# Patient Record
Sex: FEMALE | Race: WHITE | NOT HISPANIC OR LATINO | Employment: OTHER | URBAN - METROPOLITAN AREA
[De-identification: names, ages, dates, MRNs, and addresses within clinical notes are randomized per-mention and may not be internally consistent; named-entity substitution may affect disease eponyms.]

---

## 2018-09-04 ENCOUNTER — OFFICE VISIT (OUTPATIENT)
Dept: OBSTETRICS AND GYNECOLOGY | Facility: CLINIC | Age: 63
End: 2018-09-04

## 2018-09-04 VITALS
DIASTOLIC BLOOD PRESSURE: 80 MMHG | HEIGHT: 64 IN | WEIGHT: 177 LBS | SYSTOLIC BLOOD PRESSURE: 136 MMHG | BODY MASS INDEX: 30.22 KG/M2

## 2018-09-04 DIAGNOSIS — Z12.39 ENCOUNTER FOR OTHER SCREENING FOR MALIGNANT NEOPLASM OF BREAST: ICD-10-CM

## 2018-09-04 DIAGNOSIS — R35.0 FREQUENCY OF MICTURITION: ICD-10-CM

## 2018-09-04 DIAGNOSIS — Z85.3 HISTORY OF RIGHT BREAST CANCER: ICD-10-CM

## 2018-09-04 DIAGNOSIS — Z01.419 ENCOUNTER FOR WELL WOMAN EXAM WITH ROUTINE GYNECOLOGICAL EXAM: Primary | ICD-10-CM

## 2018-09-04 PROBLEM — Z90.11 HISTORY OF RIGHT MASTECTOMY: Status: ACTIVE | Noted: 2018-09-04

## 2018-09-04 PROBLEM — E03.9 ACQUIRED HYPOTHYROIDISM: Status: ACTIVE | Noted: 2018-09-04

## 2018-09-04 PROCEDURE — G0101 CA SCREEN;PELVIC/BREAST EXAM: HCPCS | Performed by: OBSTETRICS & GYNECOLOGY

## 2018-09-04 RX ORDER — DIGOXIN 250 MCG
TABLET ORAL
COMMUNITY

## 2018-09-04 RX ORDER — LEVOTHYROXINE SODIUM 0.07 MG/1
TABLET ORAL
COMMUNITY

## 2018-09-04 RX ORDER — NICOTINE POLACRILEX 4 MG/1
GUM, CHEWING ORAL
COMMUNITY

## 2018-09-04 NOTE — PROGRESS NOTES
Subjective   Chief Complaint   Patient presents with   • Annual Exam     RYLEE Beltran is a 63 y.o. year old  menopausal female presenting to be seen for her annual exam.  She's postmenopausal since about .  Last Pap test was .  There has not been vaginal bleeding in the last 12 months.  Hot flashes and night sweats are not a significant problem.    She has 3 problems she will like to discuss.  One is a skin tag that had her panty line and is bothersome.  She reports she has an appointment to see a dermatologist.    2 is a need for a oncologist as a female in [place] got in some legal problems and is no longer practices.  Apparently her  may been bringing some drugs in from [place]?  3 is that she's having urinary frequency.  Discussed coffee intake maybe 1 or 2 cups per day maybe a colon.  No real citrus intake.  She is able to go about an hour to 2 between voiding.  No real nocturia so I doubt there is a bladder infection.  Discussed that we'll give her an interstitial cystitis diet because it could be something healthy such as tomato that's irritating her bladder.  She's had a recent urinalysis that was negative.  Those records were reviewed and will be scanned into her Chart      SEXUAL Hx:  She is sexually active.  Vaginal dryness is not a problem.    HEALTH Hx:  She exercises regularly: not asked.  She wears her seat belt:yes.  She has concerns about domestic violence: no.  She has noticed changes in height: no.              Calcium intake is  adequate    The following portions of the patient's history were reviewed and updated as appropriate:problem list, current medications, allergies, past family history, past medical history, past social history and past surgical history.    Smoking status: Former Smoker                                                              Packs/day: 0.00      Years: 0.00      Smokeless tobacco: Never Used                        Review of Systems  "  Her bladder are normal has history of constipation.  3 vaginal deliveries largest was 9 lbs. 8 oz.      Objective   /80   Ht 161.3 cm (63.5\")   Wt 80.3 kg (177 lb)   BMI 30.86 kg/m²      General:  well developed; well nourished  no acute distress  appears stated age   Skin:  No suspicious lesions seen   Thyroid: not examined   Breasts:  Evidence of bilateral reconstruction with implant on the right.  Nodularity on the left breast tissue in general fibroglandular tissue   Abdomen: soft, non-tender; no masses  no umbilical or inginual hernias are present  no hepato-splenomegaly   Pelvis: Clinical staff was present for exam  External genitalia:  normal appearance of the external genitalia including Bartholin's and Pierce's glands. See below  :  normal appearance of the external genitalia including Bartholin's and Pierce's glands. There is a fairly wide based 1+ centimeter skin tag on the left inner thigh  Vaginal:  atrophic mucosal changes are present; she is able to perform a Kegel contraction upon request;  Cervix:  normal appearance. There is a small 5 mm polyp in the endocervix which is sampled with Pap testing  Uterus:  normal size, shape and consistency. anteverted;  Adnexa:  normal bimanual exam of the adnexa.  Rectal:  digital rectal exam not performed; anus visually normal appearing.        Assessment   1. Normal postmenopausal GYN exam with benign appearing skin tag left inner thigh.  Wide based discussed I could not remove that today would be happy to do that in the future should the dermatology appointment did not work out.    2. Urinary frequency but her bladder is nontender and she had a normal urinalysis recently.  I think this may be dietary more than anything we'll give her interstitial cystitis diet.  Suggest she limited to 1 or maybe 1/2 cups coffee per day.    3. Recent diagnosis of prediabetes   4. Personal history of breast cancer 2004 request oncologist to follow up with.  I have " requested that she may see one of the [facility] doctors in [facility] which is closer to her home.   5. Osteoporosis by history.    6. Constipation      Plan   1. Calcium discussed  1200 mg daily in divided doses ideally in diet  2. Regular weight bearing exercise  3. Breast self awareness, mammograms discussed-I had difficulty with a waiver form in epic today scheduling a unilateral mammogram.  Hopefully hematology oncology physician has better luck.  4. Follow-up Pap co-testing.  If she has another we could stop screening but her last was in 2002.    No orders of the defined types were placed in this encounter.          This note was electronically signed.    Luis Whiting M.D.  September 4, 2018

## 2018-09-05 PROBLEM — R73.03 PRE-DIABETES: Status: ACTIVE | Noted: 2018-09-05

## 2018-09-27 ENCOUNTER — CONSULT (OUTPATIENT)
Dept: ONCOLOGY | Facility: CLINIC | Age: 63
End: 2018-09-27

## 2018-09-27 VITALS
HEART RATE: 74 BPM | BODY MASS INDEX: 31.13 KG/M2 | HEIGHT: 63 IN | WEIGHT: 175.7 LBS | RESPIRATION RATE: 18 BRPM | TEMPERATURE: 98.4 F | DIASTOLIC BLOOD PRESSURE: 81 MMHG | SYSTOLIC BLOOD PRESSURE: 138 MMHG | OXYGEN SATURATION: 99 %

## 2018-09-27 DIAGNOSIS — Z12.11 COLON CANCER SCREENING: ICD-10-CM

## 2018-09-27 DIAGNOSIS — M81.0 POSTMENOPAUSAL OSTEOPOROSIS: ICD-10-CM

## 2018-09-27 DIAGNOSIS — Z85.3 HISTORY OF BREAST CANCER: ICD-10-CM

## 2018-09-27 DIAGNOSIS — M43.16 SPONDYLOLISTHESIS OF LUMBAR REGION: ICD-10-CM

## 2018-09-27 DIAGNOSIS — Z90.11 HISTORY OF RIGHT MASTECTOMY: Primary | ICD-10-CM

## 2018-09-27 LAB
ALBUMIN SERPL-MCNC: 4.4 G/DL (ref 3.4–4.8)
ALBUMIN/GLOB SERPL: 1.5 G/DL (ref 1.5–2.5)
ALP SERPL-CCNC: 73 U/L (ref 35–104)
ALT SERPL W P-5'-P-CCNC: 35 U/L (ref 10–36)
ANION GAP SERPL CALCULATED.3IONS-SCNC: 6.3 MMOL/L (ref 3.6–11.2)
AST SERPL-CCNC: 26 U/L (ref 10–30)
BASOPHILS # BLD AUTO: 0.02 10*3/MM3 (ref 0–0.3)
BASOPHILS NFR BLD AUTO: 0.3 % (ref 0–2)
BILIRUB SERPL-MCNC: 0.4 MG/DL (ref 0.2–1.8)
BUN BLD-MCNC: 16 MG/DL (ref 7–21)
BUN/CREAT SERPL: 16 (ref 7–25)
CALCIUM SPEC-SCNC: 9.3 MG/DL (ref 7.7–10)
CHLORIDE SERPL-SCNC: 106 MMOL/L (ref 99–112)
CO2 SERPL-SCNC: 29.7 MMOL/L (ref 24.3–31.9)
CREAT BLD-MCNC: 1 MG/DL (ref 0.43–1.29)
DEPRECATED RDW RBC AUTO: 44.4 FL (ref 37–54)
EOSINOPHIL # BLD AUTO: 0.29 10*3/MM3 (ref 0–0.7)
EOSINOPHIL NFR BLD AUTO: 4.4 % (ref 0–7)
ERYTHROCYTE [DISTWIDTH] IN BLOOD BY AUTOMATED COUNT: 14 % (ref 11.5–14.5)
GFR SERPL CREATININE-BSD FRML MDRD: 56 ML/MIN/1.73
GLOBULIN UR ELPH-MCNC: 2.9 GM/DL
GLUCOSE BLD-MCNC: 101 MG/DL (ref 70–110)
HCT VFR BLD AUTO: 40.9 % (ref 37–47)
HGB BLD-MCNC: 12.9 G/DL (ref 12–16)
IMM GRANULOCYTES # BLD: 0.01 10*3/MM3 (ref 0–0.03)
IMM GRANULOCYTES NFR BLD: 0.2 % (ref 0–0.5)
LYMPHOCYTES # BLD AUTO: 1.98 10*3/MM3 (ref 1–3)
LYMPHOCYTES NFR BLD AUTO: 29.9 % (ref 16–46)
MCH RBC QN AUTO: 27.8 PG (ref 27–33)
MCHC RBC AUTO-ENTMCNC: 31.5 G/DL (ref 33–37)
MCV RBC AUTO: 88.1 FL (ref 80–94)
MONOCYTES # BLD AUTO: 0.67 10*3/MM3 (ref 0.1–0.9)
MONOCYTES NFR BLD AUTO: 10.1 % (ref 0–12)
NEUTROPHILS # BLD AUTO: 3.66 10*3/MM3 (ref 1.4–6.5)
NEUTROPHILS NFR BLD AUTO: 55.1 % (ref 40–75)
OSMOLALITY SERPL CALC.SUM OF ELEC: 284.4 MOSM/KG (ref 273–305)
PLATELET # BLD AUTO: 231 10*3/MM3 (ref 130–400)
PMV BLD AUTO: 10.8 FL (ref 6–10)
POTASSIUM BLD-SCNC: 4.2 MMOL/L (ref 3.5–5.3)
PROT SERPL-MCNC: 7.3 G/DL (ref 6–8)
RBC # BLD AUTO: 4.64 10*6/MM3 (ref 4.2–5.4)
SODIUM BLD-SCNC: 142 MMOL/L (ref 135–153)
WBC NRBC COR # BLD: 6.63 10*3/MM3 (ref 4.5–12.5)

## 2018-09-27 PROCEDURE — 80053 COMPREHEN METABOLIC PANEL: CPT | Performed by: INTERNAL MEDICINE

## 2018-09-27 PROCEDURE — 85025 COMPLETE CBC W/AUTO DIFF WBC: CPT | Performed by: INTERNAL MEDICINE

## 2018-09-27 PROCEDURE — 99205 OFFICE O/P NEW HI 60 MIN: CPT | Performed by: INTERNAL MEDICINE

## 2018-10-02 DIAGNOSIS — M43.16 SPONDYLOLISTHESIS OF LUMBAR REGION: Primary | ICD-10-CM

## 2018-10-23 ENCOUNTER — HOSPITAL ENCOUNTER (OUTPATIENT)
Dept: MRI IMAGING | Facility: HOSPITAL | Age: 63
Discharge: HOME | End: 2018-10-23
Attending: INTERNAL MEDICINE | Admitting: INTERNAL MEDICINE

## 2018-10-23 DIAGNOSIS — M43.16 SPONDYLOLISTHESIS OF LUMBAR REGION: ICD-10-CM

## 2018-10-23 PROCEDURE — A9577 INJ MULTIHANCE: HCPCS | Performed by: INTERNAL MEDICINE

## 2018-10-23 PROCEDURE — 72158 MRI LUMBAR SPINE W/O & W/DYE: CPT | Performed by: RADIOLOGY

## 2018-10-23 PROCEDURE — 0 GADOBENATE DIMEGLUMINE 529 MG/ML SOLUTION: Performed by: INTERNAL MEDICINE

## 2018-10-23 PROCEDURE — 72158 MRI LUMBAR SPINE W/O & W/DYE: CPT

## 2018-10-23 RX ADMIN — GADOBENATE DIMEGLUMINE 16 ML: 529 INJECTION, SOLUTION INTRAVENOUS at 10:20

## 2018-10-26 ENCOUNTER — APPOINTMENT (OUTPATIENT)
Dept: BONE DENSITY | Facility: HOSPITAL | Age: 63
End: 2018-10-26

## 2018-10-29 ENCOUNTER — HOSPITAL ENCOUNTER (OUTPATIENT)
Dept: BONE DENSITY | Facility: HOSPITAL | Age: 63
Discharge: HOME | End: 2018-10-29
Admitting: INTERNAL MEDICINE

## 2018-10-29 ENCOUNTER — OFFICE VISIT (OUTPATIENT)
Dept: ONCOLOGY | Facility: CLINIC | Age: 63
End: 2018-10-29

## 2018-10-29 VITALS
DIASTOLIC BLOOD PRESSURE: 78 MMHG | HEART RATE: 61 BPM | OXYGEN SATURATION: 98 % | RESPIRATION RATE: 18 BRPM | BODY MASS INDEX: 30.77 KG/M2 | WEIGHT: 173.7 LBS | SYSTOLIC BLOOD PRESSURE: 143 MMHG | TEMPERATURE: 98.3 F

## 2018-10-29 DIAGNOSIS — Z85.3 HISTORY OF BREAST CANCER: Primary | ICD-10-CM

## 2018-10-29 DIAGNOSIS — M43.16 SPONDYLOLISTHESIS OF LUMBAR REGION: ICD-10-CM

## 2018-10-29 DIAGNOSIS — M81.0 POSTMENOPAUSAL OSTEOPOROSIS: ICD-10-CM

## 2018-10-29 PROCEDURE — 77080 DXA BONE DENSITY AXIAL: CPT | Performed by: RADIOLOGY

## 2018-10-29 PROCEDURE — 77080 DXA BONE DENSITY AXIAL: CPT

## 2018-10-29 PROCEDURE — 99214 OFFICE O/P EST MOD 30 MIN: CPT | Performed by: INTERNAL MEDICINE

## 2018-10-29 NOTE — PROGRESS NOTES
DATE OF VISIT:  10/29/2018    DIAGNOSIS:    Stage II Right Breast cancer diagnosed in     CHIEF COMPLAINT:  History of breast cancer    HISTORY OF PRESENT ILLNESS:   Ritu Beltran is a very pleasant 63 y.o. female who is being seen today at the request of No ref. provider found for evaluation and treatment of breast cancer.  She says she noticed a lump in her right breast in .  Initially she thought it was a cystic lesion because she has fibrocystic breast disease, but when it enlarged she saw her PCP and underwent further evaluation.  Ultimately, she was diagnosed with Stage II Breast Cancer acording to records from her previous oncologist.  She was treated with R modified radical mastectomy and reconstruction.  Dr. Mcwilliams delived AC x 4 followed by Taxotere x 4, which she completed in 2004, and she then took Arimidex through 2009.  She has done well since this time without evidence of recurrence.    Ms. Beltran would like to transfer her follow-up care to our office.  She is overall doing well.  She denies fever or chills.  No chest pain or shortness of breath.  No abdominal pain, nausea or vomiting.  She occasionally has constipation.  She denies rectal bleeding.  She complains of pain in her tail bone for the last 3 months after pushing a riding mower and straining her back.    INTERVAL HISTORY:  Ms. Beltran is here today for f/u of breast cancer.  Since her last visit she has been well.  She had mammogram, DEXA scan and MRI of the lower back.  She is curious to hear the outcome of testing.       PAST MEDICAL HISTORY:  Past Medical History:   Diagnosis Date   • Anxiety    • Arthritis    • Breast cancer in female (CMS/[facility])    • Depression    • Disease of thyroid gland    • Osteoporosis    • PVC (premature ventricular contraction)      Risk Factors:  Age of Menarche: 10  Age of Menopause: 49  Prior Breast Disease: + fibrocystic breast disease  Pregnancies:    History of  Breastfeeding: + with 3rd children  Age of 1st pregnancy: 15 yo  Family History of Breast Cancer: none  Family History of Ovarian Cancer: none     PAST SURGICAL HISTORY:  Past Surgical History:   Procedure Laterality Date   • EYE SURGERY     • MASTECTOMY Right 2004   • RECONSTRUCTION BREAST IMMEDIATE / DELAYED W/ TISSUE EXPANDER Right 2004   Reduction mammoplasty of the left breast    FAMILY HISTORY:  History reviewed. No pertinent family history.   No family h/o breast cancer.  No family h/o ovarian cancer.  No family h/o other malignancies.      SOCIAL HISTORY:  Social History     Social History   • Marital status: Single     Spouse name: N/A   • Number of children: N/A   • Years of education: N/A     Occupational History   • Not on file.     Social History Main Topics   • Smoking status: Former Smoker   • Smokeless tobacco: Never Used   • Alcohol use No   • Drug use: No   • Sexual activity: No     Other Topics Concern   • Not on file     Social History Narrative   • No narrative on file   Lives alone.  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  Prior smoker. Smoked for about 20 yrs, quit in 2016.    REVIEW OF SYSTEMS:   A comprehensive 14 point review of systems was performed.  Significant findings as mentioned above.  All other systems reviewed and are negative.      MEDICATIONS:  The current medication list was reviewed in the EMR    Current Outpatient Prescriptions:   •  digoxin (LANOXIN) 250 MCG tablet, digoxin 250 mcg tablet  Take 1 tablet every day by oral route., Disp: , Rfl:   •  levothyroxine (SYNTHROID, LEVOTHROID) 75 MCG tablet, levothyroxine 75 mcg tablet, Disp: , Rfl:   •  Omeprazole 20 MG tablet delayed-release, omeprazole 20 mg capsule,delayed release, Disp: , Rfl:     ALLERGIES:    Allergies   Allergen Reactions   • Sulfa Antibiotics Rash       PHYSICAL EXAM:  Vitals:    10/29/18 0943   BP: 143/78   Pulse: 61   Resp: 18   Temp: 98.3 °F (36.8 °C)   SpO2: 98%   General:  Awake, alert and oriented, in  no distress  HEENT:  Pupils are equal, round and reactive to light and accommodation, Extra-ocular movements full, Oropharyx clear, mucous membranes moist  Neck:  No JVD, thyromegaly or lymphadenopathy  CV:  Regular rate and rhythm, no murmurs, rubs or gallops  Breast:  S/p R mastectomy and reconstruction.  No palpable nodules.  L breast s/p reduction mammoplasty without mass lesions.  + fibrocystic changes.  No axillary adenopathy on either side.  Resp:  Lungs are clear to auscultation bilaterally  Abd:  Soft, non-tender, non-distended, bowel sounds present, no organomegaly or masses  Ext:  No clubbing, cyanosis or edema  Lymph:  No cervical, supraclavicular, axillary, inguinal or femoral adenopathy  Neuro:  MS as above, CN II-XII intact, grossly non-focal exam      PATHOLOGY:  Not available. Records from prior oncologist's notes.       ENDOSCOPY:  Reports c-scopy performed by Dr. Abhi Santo over 10 yrs ago.      IMAGING:  10-02-18:      10-23-18 MRI Lumbar Spine With & Without Contrast :  MRI FINDINGS: The lumbar vertebral bodies are normal in height and show  normal marrow signal. There is a grade 2 spondylolisthesis at L5-S1.  There is marked narrowing of the L5-S1 disc. The other lumbar discs were  fairly well maintained in height, but show loss of water content on the  long TR weighted scans. There is no significant posterior disc bulging.  The spinal canal was maintained in contour. There are no areas of spinal  stenosis. There are no disc herniations. There are bilateral pars  interarticularis defects at L5-S1. On the postcontrasted scans there is  no abnormal enhancement in the vertebral bodies or in the spinal cord or  cauda equina.     IMPRESSION:  Grade 2 spondylolisthesis at L5-S1 due to bilateral pars  interarticularis defects. There are no disc herniations or areas of  spinal stenosis. There is degenerative disc change, most severe at  L5-S1.     10-29-18 DEXA Bone Density Axial      FINDINGS: The  lowest T score was in the left femoral neck at 0.3. This  corresponds with normal.  All of the other bone mineral densities acquired on today's exam on the  range of normal.     IMPRESSION:  Impression: According to the World Health Organization definitions of  osteoporosis based on bone density, this patient's bone mineral density  is compatible with normal and the fracture risk is low.    RECENT LABS:  Lab Results   Component Value Date    WBC 6.63 09/27/2018    HGB 12.9 09/27/2018    HCT 40.9 09/27/2018    MCV 88.1 09/27/2018    RDW 14.0 09/27/2018     09/27/2018    NEUTRORELPCT 55.1 09/27/2018    LYMPHORELPCT 29.9 09/27/2018    MONORELPCT 10.1 09/27/2018    EOSRELPCT 4.4 09/27/2018    BASORELPCT 0.3 09/27/2018    NEUTROABS 3.66 09/27/2018    LYMPHSABS 1.98 09/27/2018     Lab Results   Component Value Date     09/27/2018    K 4.2 09/27/2018    CO2 29.7 09/27/2018     09/27/2018    BUN 16 09/27/2018    CREATININE 1.00 09/27/2018    EGFRIFNONA 56 (L) 09/27/2018    GLUCOSE 101 09/27/2018    CALCIUM 9.3 09/27/2018    ALKPHOS 73 09/27/2018    AST 26 09/27/2018    ALT 35 09/27/2018    BILITOT 0.4 09/27/2018    ALBUMIN 4.40 09/27/2018    PROTEINTOT 7.3 09/27/2018          ASSESSMENT & PLAN:  Ritu Beltran is a very pleasant 63 y.o. female with a history of Stage II Right Breast cancer diagnosed in 2004.    1.  History of breast cancer:  -  Treated with modified radical mastectomy of the R breast with reconstruction and reduction L mammoplasty.  -  Dr. Mcwilliams delivered AC x 4 followed by Taxotere x 4 which she completed in September 2004.  -  She took 5 years of Arimidex which she completed in September 2009.  -  She has not had evidence of recurrence..  - Mammogram was done in [place] and was without cause for concern. She will be due for repeat mammography in October 2019.     2.  Prophylaxis:  -  Reports she is over due for c-scope (says Dr. Santo performed last one over 10 yrs ago.   -  She says  she is on PPI and still having symptoms.  Referred back to Dr. Santo who is planning EGD and C-scope.  -  She had PAP smear 9-04-18 which showed no cause for concern.    - DEXA shows normal bone density. Recommend repeat exam in 2020.   -  Recommended HAV and 2018 influenza vaccine.    3.  Sacral pain with some radicular symptoms:  - Referral placed to Neurosurgery group in [place] and obtained requested MRI L spine which showed spondylolisthesis / Degenerative changes as above.    4.  ACO Quality measures  - Ritu Beltran does not use tobacco products.  She quit smoking in 2016.  - Patient's Body mass index is 30.77 kg/m². BMI is above normal parameters. Recommendations include: referral to primary care.  - Current outpatient and discharge medications have been reconciled for the patient.  Reviewed by: Sima Wilson MD     This note was scribed for Sima Wilson MD by Char Casarez RN.    I, Sima Wilson MD, personally performed the services described in this documentation as scribed by the above named individual in my presence, and it is both accurate and complete.  10/29/2018     I spent 25 minutes with Ritu Beltran today.  More than 50% of the time was spent in counseling / coordination of care for the above problems.      Electronically Signed by: Sima Wilson MD      CC:   aRjesh Vasquez MD

## 2022-05-18 ENCOUNTER — OFFICE VISIT (OUTPATIENT)
Dept: CARDIOLOGY | Facility: CLINIC | Age: 67
End: 2022-05-18

## 2022-05-18 VITALS
HEIGHT: 64 IN | DIASTOLIC BLOOD PRESSURE: 70 MMHG | HEART RATE: 65 BPM | SYSTOLIC BLOOD PRESSURE: 122 MMHG | BODY MASS INDEX: 27.18 KG/M2 | WEIGHT: 159.2 LBS

## 2022-05-18 DIAGNOSIS — E78.00 HYPERCHOLESTEREMIA: ICD-10-CM

## 2022-05-18 DIAGNOSIS — E03.9 HYPOTHYROIDISM, UNSPECIFIED TYPE: ICD-10-CM

## 2022-05-18 DIAGNOSIS — I10 PRIMARY HYPERTENSION: ICD-10-CM

## 2022-05-18 DIAGNOSIS — I47.1 PSVT (PAROXYSMAL SUPRAVENTRICULAR TACHYCARDIA): ICD-10-CM

## 2022-05-18 DIAGNOSIS — I49.3 PVC (PREMATURE VENTRICULAR CONTRACTION): ICD-10-CM

## 2022-05-18 DIAGNOSIS — R00.2 PALPITATIONS: Primary | ICD-10-CM

## 2022-05-18 DIAGNOSIS — E88.81 METABOLIC SYNDROME: ICD-10-CM

## 2022-05-18 DIAGNOSIS — R07.2 PRECORDIAL PAIN: ICD-10-CM

## 2022-05-18 PROBLEM — I47.10 PSVT (PAROXYSMAL SUPRAVENTRICULAR TACHYCARDIA): Status: ACTIVE | Noted: 2022-05-18

## 2022-05-18 PROBLEM — E88.810 METABOLIC SYNDROME: Status: ACTIVE | Noted: 2022-05-18

## 2022-05-18 PROCEDURE — 93000 ELECTROCARDIOGRAM COMPLETE: CPT | Performed by: INTERNAL MEDICINE

## 2022-05-18 PROCEDURE — 99204 OFFICE O/P NEW MOD 45 MIN: CPT | Performed by: INTERNAL MEDICINE

## 2022-05-18 RX ORDER — ROSUVASTATIN CALCIUM 10 MG/1
10 TABLET, COATED ORAL DAILY
COMMUNITY

## 2022-05-18 RX ORDER — ESCITALOPRAM OXALATE 5 MG/1
5 TABLET ORAL DAILY
COMMUNITY

## 2022-05-18 RX ORDER — LISINOPRIL 2.5 MG/1
2.5 TABLET ORAL DAILY
COMMUNITY
End: 2022-06-29 | Stop reason: DRUGHIGH

## 2022-05-18 NOTE — PROGRESS NOTES
Chief Complaint   Patient presents with   • Establish Care     Pt is here to re-establish care.  She previously was a pt in this office in 2009.  Her PCP had been managing her digoxin (Dr Vasquez)- she transferred to [facility] after his passing.    Pt states she does get rare pain into her chest that will go into her neck, she states she thinks it is gas related.  She does admit palpitations that occur multiple times per day.  She denies dizziness or SOB.  Pt does not take a daily ASA.   • Cardiac History     Pt was previously treated in this office in 2009.  She denies any testing since that time.   • Lab Work     Pt had labs with her PCP in April- they are faxing report over.        CARDIAC COMPLAINTS  chest pressure/discomfort and palpitations      Subjective   Ritu Beltran is a 66 y.o. female came in today for her initial cardiac evaluation.  She has history of hypertension, hypercholesterolemia and hypothyroidism.  She has been having episodes of chest pain and shortness of breath associated with some palpitation in the past.  I have seen her many years ago when she was admitted to the hospital with chest pain.  Underwent stress test in the form of dobutamine which showed reverse redistribution.  She then underwent cardiac catheterization found to have a non critical disease involving the diagonal.  She also was noted to have multiple PVC's.  He was started on beta-blockers and apparently developed some reaction to it.  She wore a monitor that showed few runs of SVT for which she was put on Lanoxin after which the palpitation did improve.  I have not seen her since then.  She recently was transferred to service.  She has been complaining of having some occasional chest pain which radiates up into her neck.  She is not sure when this happens.  She also complain of having palpitation which occurs multiple times per day.  She had labs done recently but I do not have the results.  She used to smoke in the past but  quit few years ago.  Her mother  at the age of 78 with stroke.  She had diabetes before.  Her father has heart disease.  Diabetes does run in the family.    Past Surgical History:   Procedure Laterality Date   • CARDIAC CATHETERIZATION  2007    30% D1. EF 65%. Mild MR   • CARDIOVASCULAR STRESS TEST  2007    @ [facility]. Dobutamine- 87% THR. 162/54. EF 58%.Reverse Redistribution at Bromide   • CONVERTED (HISTORICAL) HOLTER  2007    Avg 80. . 4 runs of SVT.   • CYST REMOVAL  2021    back   • ECHO - CONVERTED  2007    @ [facility]. EF 65%. ? Inferoseptal WMA. Mild MR   • EYE SURGERY     • MASTECTOMY Right    • NOSE SURGERY     • RECONSTRUCTION BREAST IMMEDIATE / DELAYED W/ TISSUE EXPANDER Right        Current Outpatient Medications   Medication Sig Dispense Refill   • digoxin (LANOXIN) 250 MCG tablet digoxin 250 mcg tablet   Take 1 tablet every day by oral route.     • Ergocalciferol (VITAMIN D2 PO) Take 50,000 Units by mouth 1 (One) Time Per Week.     • escitalopram (LEXAPRO) 5 MG tablet Take 5 mg by mouth Daily.     • levothyroxine (SYNTHROID, LEVOTHROID) 75 MCG tablet levothyroxine 75 mcg tablet     • lisinopril (PRINIVIL,ZESTRIL) 2.5 MG tablet Take 2.5 mg by mouth Daily.     • Omeprazole 20 MG tablet delayed-release omeprazole 20 mg capsule,delayed release     • rosuvastatin (CRESTOR) 10 MG tablet Take 10 mg by mouth Daily.       No current facility-administered medications for this visit.           ALLERGIES:  Chocolate, Metoprolol, and Sulfa antibiotics    Past Medical History:   Diagnosis Date   • Anxiety    • Anxiety    • Arthritis    • Breast cancer in female (HCC)    • Depression    • Disease of thyroid gland    • Gastroesophageal reflux disease    • Hyperlipidemia    • Hypertension    • Migraines    • Osteoporosis    • PVC (premature ventricular contraction)    • Vitamin D deficiency        Social History     Tobacco Use   Smoking Status Former Smoker   Smokeless  "Tobacco Never Used          Family History   Problem Relation Age of Onset   • Diabetes Mother    • Stroke Mother    • Heart disease Father    • Diabetes Brother    • Diabetes Maternal Grandmother    • Heart disease Maternal Grandfather    • Diabetes Maternal Grandfather    • Stroke Paternal Grandmother    • Inflammatory bowel disease Paternal Grandfather    • No Known Problems Daughter    • No Known Problems Daughter    • No Known Problems Son        Review of Systems   Constitutional: Negative for decreased appetite and malaise/fatigue.   HENT: Negative for congestion and sore throat.    Eyes: Negative for blurred vision.   Cardiovascular: Positive for chest pain and palpitations.   Respiratory: Negative for shortness of breath and snoring.    Endocrine: Negative for cold intolerance and heat intolerance.   Hematologic/Lymphatic: Negative for adenopathy. Does not bruise/bleed easily.   Skin: Negative for itching, nail changes and skin cancer.   Musculoskeletal: Negative for arthritis and myalgias.   Gastrointestinal: Negative for abdominal pain, dysphagia and heartburn.   Genitourinary: Negative for bladder incontinence and frequency.   Neurological: Negative for dizziness, light-headedness, seizures and vertigo.   Psychiatric/Behavioral: Negative for altered mental status.   Allergic/Immunologic: Negative for environmental allergies and hives.       Diabetes- No  Thyroid- abnormal    Objective     /70 (BP Location: Left arm, Patient Position: Sitting)   Pulse 65   Ht 162.6 cm (64\")   Wt 72.2 kg (159 lb 3.2 oz)   BMI 27.33 kg/m²     Vitals and nursing note reviewed.   Constitutional:       Appearance: Healthy appearance. Not in distress.   Eyes:      Conjunctiva/sclera: Conjunctivae normal.      Pupils: Pupils are equal, round, and reactive to light.   HENT:      Head: Normocephalic.   Pulmonary:      Effort: Pulmonary effort is normal.      Breath sounds: Normal breath sounds.   Cardiovascular:      PMI " at left midclavicular line. Normal rate. Regular rhythm.      Murmurs: There is a systolic murmur.   Abdominal:      General: Bowel sounds are normal.      Palpations: Abdomen is soft.   Musculoskeletal: Normal range of motion.      Cervical back: Normal range of motion and neck supple. Skin:     General: Skin is warm and dry.   Neurological:      Mental Status: Alert, oriented to person, place, and time and oriented to person, place and time.           ECG 12 Lead    Date/Time: 5/18/2022 12:53 PM  Performed by: Tejas Jackson MD  Authorized by: Tejas Jackson MD   Comparison: compared with previous ECG from 7/16/2007  Comparison to previous ECG: Q waves in V1 & V2  Rhythm: sinus rhythm  Rate: normal  Q waves: V1 and V2    QRS axis: normal    Clinical impression: abnormal EKG              @ASSESSMENT/PLAN@  BMI is >= 25 and < 30. (Overweight) The following options were offered after discussion: weight loss educational material (shared in after visit summary), exercise counseling/recommendations and nutrition counseling/recommendations     Diagnoses and all orders for this visit:    1. Palpitations (Primary)  -     Stress Test With Myocardial Perfusion One Day; Future  -     Adult Transthoracic Echo Complete W/ Cont if Necessary Per Protocol; Future    2. Primary hypertension    3. Hypercholesteremia    4. Hypothyroidism, unspecified type    5. PVC (premature ventricular contraction)    6. PSVT (paroxysmal supraventricular tachycardia) (HCC)  -     Adult Transthoracic Echo Complete W/ Cont if Necessary Per Protocol; Future    7. Precordial pain  -     Stress Test With Myocardial Perfusion One Day; Future    8. Metabolic syndrome    At baseline her heart rate is stable.  Her blood pressure is normal.  Her EKG shows normal sinus rhythm, Q waves in the anterior leads.  Her clinical examination reveals a BMI of 27.  She does have a soft systolic murmur at the mitral area.    Regarding the palpitation, she had  PVC in the past and then she also had few runs of SVT.  She still has some palpitation but not as frequent.  I scheduled her to undergo a stress test to evaluate her functional status, chronotropic response, blood pressure response and also to rule out stress-induced arrhythmia or ischemia since she does have episodes of chest pain also.  I also advised her to undergo an echocardiogram to evaluate for LVH, wall motion abnormality, valvular structures as well as the PA pressure.    Regarding her hypertension, it seems to be controlled with lisinopril.  If she does have increased chronotropic response, she may need to be considered for a calcium channel blockers in the form of Cardizem    Regarding her hypercholesterolemia, she is on Crestor.  Continue the same.  She needs LFT and lipid panel checked    Regarding her history of hypothyroidism, she is taking thyroid supplements.  She needs a TSH level checked along with magnesium level.    Regarding her history of PVC as well as PSVT, if she continues to have the arrhythmia then consider changing Lanoxin to a calcium channel blocker, since she had some kind of allergy to metoprolol    Regarding the metabolic syndrome, talked to her about diet and weight reduction.  I gave her papers on Mediterranean diet    Regarding advanced directive, I talked to her about living will and power of  and gave her booklets regarding that.    Based on the results, further recommendations will be made.               Electronically signed by Tejas Jackson MD May 18, 2022 12:52 EDT

## 2022-06-23 ENCOUNTER — HOSPITAL ENCOUNTER (OUTPATIENT)
Dept: CARDIOLOGY | Facility: HOSPITAL | Age: 67
Discharge: HOME | End: 2022-06-23

## 2022-06-23 DIAGNOSIS — R00.2 PALPITATIONS: ICD-10-CM

## 2022-06-23 DIAGNOSIS — R07.2 PRECORDIAL PAIN: ICD-10-CM

## 2022-06-23 DIAGNOSIS — I47.1 PSVT (PAROXYSMAL SUPRAVENTRICULAR TACHYCARDIA): ICD-10-CM

## 2022-06-23 LAB
AORTIC DIMENSIONLESS INDEX: 0.8 (DI)
BH CV ECHO MEAS - ACS: 1.71 CM
BH CV ECHO MEAS - AO MAX PG: 4.8 MMHG
BH CV ECHO MEAS - AO MEAN PG: 2.9 MMHG
BH CV ECHO MEAS - AO ROOT DIAM: 2.6 CM
BH CV ECHO MEAS - AO V2 MAX: 109.9 CM/SEC
BH CV ECHO MEAS - AO V2 VTI: 28.1 CM
BH CV ECHO MEAS - AVA(I,D): 2.8 CM2
BH CV ECHO MEAS - EDV(CUBED): 34.6 ML
BH CV ECHO MEAS - EDV(MOD-SP4): 75.7 ML
BH CV ECHO MEAS - EF(MOD-SP4): 55.5 %
BH CV ECHO MEAS - EF_3D-VOL: 65 %
BH CV ECHO MEAS - ESV(CUBED): 7.2 ML
BH CV ECHO MEAS - ESV(MOD-SP4): 33.7 ML
BH CV ECHO MEAS - FS: 40.8 %
BH CV ECHO MEAS - IVS/LVPW: 1.35 CM
BH CV ECHO MEAS - IVSD: 1.43 CM
BH CV ECHO MEAS - LA A2CS (ATRIAL LENGTH): 5.3 CM
BH CV ECHO MEAS - LA A4C LENGTH: 4.7 CM
BH CV ECHO MEAS - LA DIMENSION: 3.3 CM
BH CV ECHO MEAS - LAT PEAK E' VEL: 11.4 CM/SEC
BH CV ECHO MEAS - LV DIASTOLIC VOL/BSA (35-75): 42.7 CM2
BH CV ECHO MEAS - LV MASS(C)D: 129.9 GRAMS
BH CV ECHO MEAS - LV MAX PG: 3.4 MMHG
BH CV ECHO MEAS - LV MEAN PG: 1.65 MMHG
BH CV ECHO MEAS - LV SYSTOLIC VOL/BSA (12-30): 19 CM2
BH CV ECHO MEAS - LV V1 MAX: 91.7 CM/SEC
BH CV ECHO MEAS - LV V1 VTI: 22.7 CM
BH CV ECHO MEAS - LVIDD: 3.3 CM
BH CV ECHO MEAS - LVIDS: 1.93 CM
BH CV ECHO MEAS - LVOT AREA: 3.5 CM2
BH CV ECHO MEAS - LVOT DIAM: 2.12 CM
BH CV ECHO MEAS - LVPWD: 1.06 CM
BH CV ECHO MEAS - MED PEAK E' VEL: 6.4 CM/SEC
BH CV ECHO MEAS - MR MAX PG: 112.9 MMHG
BH CV ECHO MEAS - MR MAX VEL: 531.3 CM/SEC
BH CV ECHO MEAS - MV A MAX VEL: 83.1 CM/SEC
BH CV ECHO MEAS - MV DEC SLOPE: 291.6 CM/SEC2
BH CV ECHO MEAS - MV E MAX VEL: 79.7 CM/SEC
BH CV ECHO MEAS - MV E/A: 0.96
BH CV ECHO MEAS - MV MAX PG: 4.3 MMHG
BH CV ECHO MEAS - MV MEAN PG: 1.7 MMHG
BH CV ECHO MEAS - MV P1/2T: 94.1 MSEC
BH CV ECHO MEAS - MV V2 VTI: 35.4 CM
BH CV ECHO MEAS - MVA(P1/2T): 2.34 CM2
BH CV ECHO MEAS - MVA(VTI): 2.26 CM2
BH CV ECHO MEAS - PA V2 MAX: 97.6 CM/SEC
BH CV ECHO MEAS - RAP SYSTOLE: 10 MMHG
BH CV ECHO MEAS - RV MAX PG: 1.43 MMHG
BH CV ECHO MEAS - RV V1 MAX: 59.8 CM/SEC
BH CV ECHO MEAS - RV V1 VTI: 13 CM
BH CV ECHO MEAS - RVDD: 3 CM
BH CV ECHO MEAS - RVSP: 35.5 MMHG
BH CV ECHO MEAS - SI(MOD-SP4): 23.7 ML/M2
BH CV ECHO MEAS - SV(LVOT): 79.9 ML
BH CV ECHO MEAS - SV(MOD-SP4): 42 ML
BH CV ECHO MEAS - TR MAX PG: 25.5 MMHG
BH CV ECHO MEAS - TR MAX VEL: 252.3 CM/SEC
BH CV ECHO MEASUREMENTS AVERAGE E/E' RATIO: 8.96
BH CV REST NUCLEAR ISOTOPE DOSE: 10 MCI
BH CV STRESS NUCLEAR ISOTOPE DOSE: 30 MCI
BH CV STRESS RECOVERY BP: NORMAL MMHG
BH CV STRESS RECOVERY HR: 65 BPM
LEFT ATRIUM VOLUME INDEX: 20.6 ML/M2
LEFT ATRIUM VOLUME: 36.4 ML
LV EF NUC BP: 58 %
MAXIMAL PREDICTED HEART RATE: 152 BPM
MAXIMAL PREDICTED HEART RATE: 152 BPM
PERCENT MAX PREDICTED HR: 88.16 %
STRESS BASELINE BP: NORMAL MMHG
STRESS BASELINE HR: 52 BPM
STRESS PERCENT HR: 104 %
STRESS POST ESTIMATED WORKLOAD: 7 METS
STRESS POST EXERCISE DUR MIN: 6 MIN
STRESS POST PEAK BP: NORMAL MMHG
STRESS POST PEAK HR: 134 BPM
STRESS TARGET HR: 129 BPM
STRESS TARGET HR: 129 BPM

## 2022-06-23 PROCEDURE — 78452 HT MUSCLE IMAGE SPECT MULT: CPT

## 2022-06-23 PROCEDURE — 78452 HT MUSCLE IMAGE SPECT MULT: CPT | Performed by: INTERNAL MEDICINE

## 2022-06-23 PROCEDURE — 0 TECHNETIUM SESTAMIBI: Performed by: INTERNAL MEDICINE

## 2022-06-23 PROCEDURE — A9500 TC99M SESTAMIBI: HCPCS | Performed by: INTERNAL MEDICINE

## 2022-06-23 PROCEDURE — 93306 TTE W/DOPPLER COMPLETE: CPT | Performed by: INTERNAL MEDICINE

## 2022-06-23 PROCEDURE — 93017 CV STRESS TEST TRACING ONLY: CPT

## 2022-06-23 PROCEDURE — 93306 TTE W/DOPPLER COMPLETE: CPT

## 2022-06-23 PROCEDURE — 93018 CV STRESS TEST I&R ONLY: CPT | Performed by: INTERNAL MEDICINE

## 2022-06-23 RX ADMIN — TECHNETIUM TC 99M SESTAMIBI 1 DOSE: 1 INJECTION INTRAVENOUS at 08:42

## 2022-06-23 RX ADMIN — TECHNETIUM TC 99M SESTAMIBI 1 DOSE: 1 INJECTION INTRAVENOUS at 10:10

## 2022-06-29 ENCOUNTER — TELEPHONE (OUTPATIENT)
Dept: CARDIOLOGY | Facility: CLINIC | Age: 67
End: 2022-06-29

## 2022-06-29 RX ORDER — LISINOPRIL 5 MG/1
5 TABLET ORAL DAILY
Qty: 30 TABLET | Refills: 11 | Status: SHIPPED | OUTPATIENT
Start: 2022-06-29 | End: 2022-12-08 | Stop reason: SDUPTHER

## 2022-06-29 NOTE — TELEPHONE ENCOUNTER
----- Message from Tejas Jackson MD sent at 6/23/2022  4:48 PM EDT -----  Normal.  Increase Lisinopril

## 2022-06-29 NOTE — TELEPHONE ENCOUNTER
Patient aware of stress test results and recommendations to increase lisinopril to 5 mg daily.  [facility]

## 2022-12-08 ENCOUNTER — OFFICE VISIT (OUTPATIENT)
Dept: CARDIOLOGY | Facility: CLINIC | Age: 67
End: 2022-12-08

## 2022-12-08 VITALS
WEIGHT: 165.6 LBS | SYSTOLIC BLOOD PRESSURE: 152 MMHG | DIASTOLIC BLOOD PRESSURE: 88 MMHG | HEART RATE: 68 BPM | HEIGHT: 64 IN | BODY MASS INDEX: 28.27 KG/M2

## 2022-12-08 DIAGNOSIS — R00.2 PALPITATIONS: ICD-10-CM

## 2022-12-08 DIAGNOSIS — I47.1 PSVT (PAROXYSMAL SUPRAVENTRICULAR TACHYCARDIA): ICD-10-CM

## 2022-12-08 DIAGNOSIS — I49.3 PVC (PREMATURE VENTRICULAR CONTRACTION): ICD-10-CM

## 2022-12-08 DIAGNOSIS — E66.3 OVERWEIGHT: ICD-10-CM

## 2022-12-08 DIAGNOSIS — I10 PRIMARY HYPERTENSION: Primary | ICD-10-CM

## 2022-12-08 DIAGNOSIS — E78.00 HYPERCHOLESTEREMIA: ICD-10-CM

## 2022-12-08 PROCEDURE — 99214 OFFICE O/P EST MOD 30 MIN: CPT | Performed by: NURSE PRACTITIONER

## 2022-12-08 RX ORDER — LISINOPRIL 5 MG/1
5 TABLET ORAL DAILY
Qty: 90 TABLET | Refills: 2 | Status: SHIPPED | OUTPATIENT
Start: 2022-12-08

## 2022-12-08 RX ORDER — DILTIAZEM HYDROCHLORIDE 120 MG/1
120 CAPSULE, COATED, EXTENDED RELEASE ORAL DAILY
Qty: 30 CAPSULE | Refills: 0 | Status: SHIPPED | OUTPATIENT
Start: 2022-12-08 | End: 2023-01-03

## 2022-12-08 RX ORDER — MELOXICAM 7.5 MG/1
7.5 TABLET ORAL DAILY
COMMUNITY

## 2022-12-08 NOTE — PROGRESS NOTES
Chief Complaint   Patient presents with   • Follow-up     Pt is here for cardiac follow up.  Pt admits palpitations daily.  She denies CP, dizziness or SOB.  Pt does not take a daily ASA.   • Med Refill     Pt request 30 day refills to be sent to [facility].  Medications were verified by medication bottles.     • Lab Work     Pt states their last labs were about 6 months ago with her PCP.         Cardiac Complaints  palpitations      Subjective   Ritu Beltran is a 67 y.o. female with HTN, hyperlipidemia, palpitations, and hypothyroidism. Shew was referred in May 2022 for CP, SOA, and palpitations. She had been evaluated in the hospital many years prior and underwent stress test in the form of dobutamine which showed reverse redistribution. She then underwent cardiac catheterization found to have a non critical disease involving the diagonal.  She also was noted to have multiple PVC's.  He was started on beta-blockers and apparently developed some reaction to it.  She wore a monitor that showed few runs of SVT for which she was put on Lanoxin after which the palpitation did improve. She was urged to undergo cardiac workup at consult with stress and echo. Stress showed HTN response, EF 58%, and no obvious ischemia noted, lisinopril dosing increased. Mild pulm HTN noted on echo with mild to mod MR.      She comes today for follow up and continues to have palpitations that she describes daily, has noticed for years. No CP, dizziness, SOA, or syncope noted. Labs followed by PCP, checked about 6 months prior. Refills requested.           Cardiac History  Past Surgical History:   Procedure Laterality Date   • CARDIAC CATHETERIZATION  02/21/2007    30% D1. EF 65%. Mild MR   • CARDIOVASCULAR STRESS TEST  02/20/2007    @ [facility]. Dobutamine- 87% THR. 162/54. EF 58%.Reverse Redistribution at [place]   • CARDIOVASCULAR STRESS TEST  06/23/2022    6 Min.7.0 METS. 88% THR. BP- 221/82. EF 58%. Breast Attenuation   •  CONVERTED (HISTORICAL) HOLTER  07/05/2007    Avg 80. . 4 runs of SVT.   • CYST REMOVAL  08/2021    back   • ECHO - CONVERTED  02/20/2007    @ [facility]. EF 65%. ? Inferoseptal WMA. Mild MR   • ECHO - CONVERTED  06/23/2022    EF 60%. Mild-Mod MR. RVSP- 36 mmHg   • EYE SURGERY     • MASTECTOMY Right 2004   • NOSE SURGERY  1968   • RECONSTRUCTION BREAST IMMEDIATE / DELAYED W/ TISSUE EXPANDER Right 2004       Current Outpatient Medications   Medication Sig Dispense Refill   • digoxin (LANOXIN) 250 MCG tablet digoxin 250 mcg tablet   Take 1 tablet every day by oral route.     • Ergocalciferol (VITAMIN D2 PO) Take 50,000 Units by mouth 1 (One) Time Per Week.     • escitalopram (LEXAPRO) 5 MG tablet Take 5 mg by mouth Daily.     • levothyroxine (SYNTHROID, LEVOTHROID) 75 MCG tablet levothyroxine 75 mcg tablet     • lisinopril (PRINIVIL,ZESTRIL) 5 MG tablet Take 1 tablet by mouth Daily. 90 tablet 2   • meloxicam (MOBIC) 7.5 MG tablet Take 7.5 mg by mouth Daily.     • Omeprazole 20 MG tablet delayed-release omeprazole 20 mg capsule,delayed release     • rosuvastatin (CRESTOR) 10 MG tablet Take 10 mg by mouth Daily.     • dilTIAZem CD (Cardizem CD) 120 MG 24 hr capsule Take 1 capsule by mouth Daily. 30 capsule 0     No current facility-administered medications for this visit.       Chocolate, Metoprolol, and Sulfa antibiotics    Past Medical History:   Diagnosis Date   • Anxiety    • Anxiety    • Arthritis    • Breast cancer in female (HCC) 2004   • Depression    • Disease of thyroid gland    • Gastroesophageal reflux disease    • Hyperlipidemia    • Hypertension    • Migraines    • Osteoporosis    • PVC (premature ventricular contraction)    • Vitamin D deficiency        Social History     Socioeconomic History   • Marital status: Single   Tobacco Use   • Smoking status: Former   • Smokeless tobacco: Never   Vaping Use   • Vaping Use: Never used   Substance and Sexual Activity   • Alcohol use: No   • Drug use: No  "      Family History   Problem Relation Age of Onset   • Diabetes Mother    • Stroke Mother    • Heart disease Father    • Diabetes Brother    • Diabetes Maternal Grandmother    • Heart disease Maternal Grandfather    • Diabetes Maternal Grandfather    • Stroke Paternal Grandmother    • Inflammatory bowel disease Paternal Grandfather    • No Known Problems Daughter    • No Known Problems Daughter    • No Known Problems Son        Review of Systems   Constitutional: Negative for malaise/fatigue and night sweats.   Cardiovascular: Positive for palpitations. Negative for chest pain, claudication, dyspnea on exertion, irregular heartbeat, leg swelling, orthopnea and syncope.   Respiratory: Negative for cough, shortness of breath and wheezing.    Musculoskeletal: Positive for stiffness. Negative for back pain and joint pain.   Gastrointestinal: Negative for anorexia, nausea and vomiting.   Genitourinary: Negative for dysuria, hematuria, hesitancy and nocturia.   Neurological: Negative for dizziness, headaches and light-headedness.   Psychiatric/Behavioral: Negative for depression and memory loss. The patient is not nervous/anxious.            Objective     /88 (BP Location: Left arm, Patient Position: Sitting)   Pulse 68   Ht 162.6 cm (64\")   Wt 75.1 kg (165 lb 9.6 oz)   BMI 28.43 kg/m²     Constitutional:       Appearance: Not in distress.   Eyes:      Pupils: Pupils are equal, round, and reactive to light.   HENT:      Nose: Nose normal.   Pulmonary:      Effort: Pulmonary effort is normal.      Breath sounds: Normal breath sounds.   Cardiovascular:      PMI at left midclavicular line. Normal rate. Regular rhythm.      Murmurs: There is a systolic murmur.   Abdominal:      Palpations: Abdomen is soft.   Musculoskeletal: Normal range of motion.      Cervical back: Normal range of motion and neck supple. Skin:     General: Skin is warm and dry.   Neurological:      Mental Status: Alert and oriented to person, " place and time.         Procedures         Diagnoses and all orders for this visit:    1. Primary hypertension (Primary)    2. PVC (premature ventricular contraction)    3. PSVT (paroxysmal supraventricular tachycardia) (HCC)    4. Palpitations    5. Hypercholesteremia    6. Overweight    Other orders  -     dilTIAZem CD (Cardizem CD) 120 MG 24 hr capsule; Take 1 capsule by mouth Daily.  Dispense: 30 capsule; Refill: 0  -     lisinopril (PRINIVIL,ZESTRIL) 5 MG tablet; Take 1 tablet by mouth Daily.  Dispense: 90 tablet; Refill: 2             Palpitations: Noted today, reported as daily. She was advised to limit her caffeine intake. We will continue on digoxin therapy but add cardizem therapy at 120mg XL daily. If HR should drop too low with addition, we will urge to lower digoxin to 125mcg daily. She will assess symptoms.    HTN: BP still not at goal. Will not increase lisinopril but urge addition of CCB with concomitant palpitations. Limited sodium urged.     Hyperlipidemia: On crestor therapy. Tolerates well. FLP with your office, can we have for review? Limited carb diet urged.     Cardiac status: Stable today. Most recent workup from 2022 negative for ischemia, HTN BP response, and normal LV function.      Hypothyroidism: On synthroid therapy, tolerates well. TSH followed by your office. Palpitations noted, could be hypothyroidism related.     BMI noted at 28.43, good cardiac diet urged with walking as tolerated advised.     Refills per request.    6 month follow up advised or sooner if needed.         Problems Addressed this Visit        Cardiac and Vasculature    PSVT (paroxysmal supraventricular tachycardia) (HCC)    Relevant Medications    dilTIAZem CD (Cardizem CD) 120 MG 24 hr capsule    PVC (premature ventricular contraction)    Relevant Medications    dilTIAZem CD (Cardizem CD) 120 MG 24 hr capsule    Hypercholesteremia    Primary hypertension - Primary    Relevant Medications    dilTIAZem CD (Cardizem  CD) 120 MG 24 hr capsule    lisinopril (PRINIVIL,ZESTRIL) 5 MG tablet    Palpitations   Other Visit Diagnoses     Overweight          Diagnoses       Codes Comments    Primary hypertension    -  Primary ICD-10-CM: I10  ICD-9-CM: 401.9     PVC (premature ventricular contraction)     ICD-10-CM: I49.3  ICD-9-CM: 427.69     PSVT (paroxysmal supraventricular tachycardia) (HCC)     ICD-10-CM: I47.1  ICD-9-CM: 427.0     Palpitations     ICD-10-CM: R00.2  ICD-9-CM: 785.1     Hypercholesteremia     ICD-10-CM: E78.00  ICD-9-CM: 272.0     Overweight     ICD-10-CM: E66.3  ICD-9-CM: 278.02                        Electronically signed by Colette Garibay, DENISE December 8, 2022 09:46 EST

## 2023-01-03 RX ORDER — DILTIAZEM HYDROCHLORIDE 120 MG/1
120 CAPSULE, COATED, EXTENDED RELEASE ORAL DAILY
Qty: 30 CAPSULE | Refills: 3 | Status: SHIPPED | OUTPATIENT
Start: 2023-01-03

## 2023-06-14 ENCOUNTER — OFFICE VISIT (OUTPATIENT)
Dept: CARDIOLOGY | Facility: CLINIC | Age: 68
End: 2023-06-14
Payer: MEDICARE

## 2023-06-14 VITALS
WEIGHT: 166.4 LBS | DIASTOLIC BLOOD PRESSURE: 84 MMHG | HEIGHT: 64 IN | BODY MASS INDEX: 28.41 KG/M2 | SYSTOLIC BLOOD PRESSURE: 144 MMHG | HEART RATE: 58 BPM

## 2023-06-14 DIAGNOSIS — E66.3 OVERWEIGHT: ICD-10-CM

## 2023-06-14 DIAGNOSIS — E78.00 HYPERCHOLESTEREMIA: ICD-10-CM

## 2023-06-14 DIAGNOSIS — I10 PRIMARY HYPERTENSION: ICD-10-CM

## 2023-06-14 DIAGNOSIS — R00.2 PALPITATIONS: ICD-10-CM

## 2023-06-14 DIAGNOSIS — I49.3 PVC (PREMATURE VENTRICULAR CONTRACTION): ICD-10-CM

## 2023-06-14 DIAGNOSIS — I47.1 PSVT (PAROXYSMAL SUPRAVENTRICULAR TACHYCARDIA): Primary | ICD-10-CM

## 2023-06-14 RX ORDER — DILTIAZEM HYDROCHLORIDE 120 MG/1
120 CAPSULE, COATED, EXTENDED RELEASE ORAL DAILY
Qty: 90 CAPSULE | Refills: 3 | Status: SHIPPED | OUTPATIENT
Start: 2023-06-14

## 2023-06-14 RX ORDER — LISINOPRIL 10 MG/1
10 TABLET ORAL DAILY
Qty: 90 TABLET | Refills: 3 | Status: SHIPPED | OUTPATIENT
Start: 2023-06-14

## 2023-06-14 NOTE — PROGRESS NOTES
Chief Complaint   Patient presents with    Follow-up     Pt is here for cardiac follow up. Pt states she does have palpitations, but relates it to caffeine.   Pt denies CP, SOB, or dizziness. Pt does not take a daily ASA.      Med Refill     Pt request 30 day refills to be sent to the [facility].  Medications were verified by the pt.      Lab Work     Pt states their last labs were in the Spring with her PCP.         Cardiac Complaints  palpitations      Subjective   Ritu Beltran is a 67 y.o. female with HTN, hyperlipidemia, palpitations, and hypothyroidism. Shew was referred in May 2022 for CP, SOA, and palpitations. She had been evaluated in the hospital many years prior and underwent stress test in the form of dobutamine which showed reverse redistribution. She then underwent cardiac catheterization found to have a non critical disease involving the diagonal.  She also was noted to have multiple PVC's.  He was started on beta-blockers and apparently developed some reaction to it.  She wore a monitor that showed few runs of SVT for which she was put on Lanoxin after which the palpitation did improve. She was urged to undergo cardiac workup at consult with stress and echo. Stress showed HTN response, EF 58%, and no obvious ischemia noted, lisinopril dosing increased. Mild pulm HTN noted on echo with mild to mod MR. Last visit, medication changes made for better BP control.    She comes today for follow up and continues to have palpitations that she describes daily, has noticed for years, usually with caffeine. No CP, dizziness, SOA, or syncope noted. Labs followed by PCP, checked about 6 months prior. Refills requested.          Cardiac History  Past Surgical History:   Procedure Laterality Date    CARDIAC CATHETERIZATION  02/21/2007    30% D1. EF 65%. Mild MR    CARDIOVASCULAR STRESS TEST  02/20/2007    @ [facility]. Dobutamine- 87% THR. 162/54. EF 58%.Reverse Redistribution at [place]    CARDIOVASCULAR STRESS  TEST  06/23/2022    6 Min.7.0 METS. 88% THR. BP- 221/82. EF 58%. Breast Attenuation    CONVERTED (HISTORICAL) HOLTER  07/05/2007    Avg 80. . 4 runs of SVT.    CYST REMOVAL  08/2021    back    ECHO - CONVERTED  02/20/2007    @ [facility]. EF 65%. ? Inferoseptal WMA. Mild MR    ECHO - CONVERTED  06/23/2022    EF 60%. Mild-Mod MR. RVSP- 36 mmHg    EYE SURGERY      MASTECTOMY Right 2004    NOSE SURGERY  1968    RECONSTRUCTION BREAST IMMEDIATE / DELAYED W/ TISSUE EXPANDER Right 2004       Current Outpatient Medications   Medication Sig Dispense Refill    digoxin (LANOXIN) 250 MCG tablet digoxin 250 mcg tablet   Take 1 tablet every day by oral route.      dilTIAZem CD (CARDIZEM CD) 120 MG 24 hr capsule Take 1 capsule by mouth Daily. 90 capsule 3    Ergocalciferol (VITAMIN D2 PO) Take 50,000 Units by mouth 1 (One) Time Per Week.      escitalopram (LEXAPRO) 5 MG tablet Take 1 tablet by mouth Daily.      LATANOPROST OP Latanoprost      levothyroxine (SYNTHROID, LEVOTHROID) 75 MCG tablet levothyroxine 75 mcg tablet      meloxicam (MOBIC) 7.5 MG tablet Take 1 tablet by mouth Daily.      Omeprazole 20 MG tablet delayed-release omeprazole 20 mg capsule,delayed release      rosuvastatin (CRESTOR) 10 MG tablet Take 1 tablet by mouth Daily.      lisinopril (PRINIVIL,ZESTRIL) 10 MG tablet Take 1 tablet by mouth Daily. 90 tablet 3     No current facility-administered medications for this visit.       Chocolate, Metoprolol, and Sulfa antibiotics    Past Medical History:   Diagnosis Date    Anxiety     Anxiety     Arthritis     Breast cancer in female 2004    Depression     Disease of thyroid gland     Gastroesophageal reflux disease     Glaucoma     Hyperlipidemia     Hypertension     Migraines     Osteoporosis     PVC (premature ventricular contraction)     Vitamin D deficiency        Social History     Socioeconomic History    Marital status: Single   Tobacco Use    Smoking status: Former    Smokeless tobacco: Never   Vaping Use     "Vaping Use: Never used   Substance and Sexual Activity    Alcohol use: No    Drug use: No       Family History   Problem Relation Age of Onset    Diabetes Mother     Stroke Mother     Heart disease Father     Diabetes Brother     Diabetes Maternal Grandmother     Heart disease Maternal Grandfather     Diabetes Maternal Grandfather     Stroke Paternal Grandmother     Inflammatory bowel disease Paternal Grandfather     No Known Problems Daughter     No Known Problems Daughter     No Known Problems Son        Review of Systems   Constitutional: Negative for malaise/fatigue and night sweats.   Cardiovascular:  Positive for palpitations. Negative for chest pain, claudication, dyspnea on exertion, irregular heartbeat, leg swelling, near-syncope, orthopnea and syncope.   Respiratory:  Negative for cough, shortness of breath and wheezing.    Musculoskeletal:  Positive for stiffness. Negative for back pain and joint pain.   Gastrointestinal:  Negative for anorexia, heartburn, nausea and vomiting.   Genitourinary:  Negative for dysuria, hematuria, hesitancy and nocturia.   Neurological:  Negative for dizziness, headaches and light-headedness.   Psychiatric/Behavioral:  Negative for depression and memory loss. The patient is not nervous/anxious.          Objective     /84 (BP Location: Left arm, Patient Position: Sitting)   Pulse 58   Ht 162.6 cm (64\")   Wt 75.5 kg (166 lb 6.4 oz)   BMI 28.56 kg/m²     Constitutional:       Appearance: Not in distress.   Eyes:      Pupils: Pupils are equal, round, and reactive to light.   HENT:      Nose: Nose normal.   Pulmonary:      Effort: Pulmonary effort is normal.      Breath sounds: Normal breath sounds.   Cardiovascular:      PMI at left midclavicular line. Normal rate. Regular rhythm.      Murmurs: There is a systolic murmur.   Abdominal:      Palpations: Abdomen is soft.   Musculoskeletal: Normal range of motion.      Cervical back: Normal range of motion and neck supple. " Skin:     General: Skin is warm and dry.   Neurological:      Mental Status: Alert.       Procedures         Diagnoses and all orders for this visit:    1. PSVT (paroxysmal supraventricular tachycardia) (Primary)    2. Primary hypertension    3. PVC (premature ventricular contraction)    4. Palpitations    5. Hypercholesteremia    6. Overweight    Other orders  -     lisinopril (PRINIVIL,ZESTRIL) 10 MG tablet; Take 1 tablet by mouth Daily.  Dispense: 90 tablet; Refill: 3  -     dilTIAZem CD (CARDIZEM CD) 120 MG 24 hr capsule; Take 1 capsule by mouth Daily.  Dispense: 90 capsule; Refill: 3             Palpitations: Noted today, reported as daily. She was advised to limit her caffeine intake. We will continue on digoxin therapy as well as cardizem.     HTN: BP still not at goal. Will not increase lisinopril to 10mg daily and continue cardizem at same. Limited sodium urged.      Hyperlipidemia: On crestor therapy. Tolerates well. FLP with your office, can we have for review? Limited carb diet urged.      Cardiac status: Stable today. Most recent workup from 2022 negative for ischemia, HTN BP response, and normal LV function.       Hypothyroidism: On synthroid therapy, tolerates well. TSH followed by your office. Palpitations noted, no worse than prior.     BMI noted at 28.56, good cardiac diet urged with walking as tolerated advised.      Refills per request.     6 month follow up advised or sooner if needed.         Problems Addressed this Visit          Cardiac and Vasculature    PSVT (paroxysmal supraventricular tachycardia) - Primary    Relevant Medications    dilTIAZem CD (CARDIZEM CD) 120 MG 24 hr capsule    PVC (premature ventricular contraction)    Relevant Medications    dilTIAZem CD (CARDIZEM CD) 120 MG 24 hr capsule    Hypercholesteremia    Primary hypertension    Relevant Medications    lisinopril (PRINIVIL,ZESTRIL) 10 MG tablet    dilTIAZem CD (CARDIZEM CD) 120 MG 24 hr capsule    Palpitations     Other  Visit Diagnoses       Overweight              Diagnoses         Codes Comments    PSVT (paroxysmal supraventricular tachycardia)    -  Primary ICD-10-CM: I47.1  ICD-9-CM: 427.0     Primary hypertension     ICD-10-CM: I10  ICD-9-CM: 401.9     PVC (premature ventricular contraction)     ICD-10-CM: I49.3  ICD-9-CM: 427.69     Palpitations     ICD-10-CM: R00.2  ICD-9-CM: 785.1     Hypercholesteremia     ICD-10-CM: E78.00  ICD-9-CM: 272.0     Overweight     ICD-10-CM: E66.3  ICD-9-CM: 278.02                       Electronically signed by Colette Garibay, APRN June 14, 2023 11:28 EDT

## 2024-01-03 ENCOUNTER — OFFICE VISIT (OUTPATIENT)
Dept: CARDIOLOGY | Facility: CLINIC | Age: 69
End: 2024-01-03
Payer: MEDICARE

## 2024-01-03 VITALS
HEIGHT: 64 IN | DIASTOLIC BLOOD PRESSURE: 90 MMHG | HEART RATE: 60 BPM | BODY MASS INDEX: 28.1 KG/M2 | WEIGHT: 164.6 LBS | SYSTOLIC BLOOD PRESSURE: 144 MMHG

## 2024-01-03 DIAGNOSIS — R00.2 PALPITATIONS: ICD-10-CM

## 2024-01-03 DIAGNOSIS — I47.10 PSVT (PAROXYSMAL SUPRAVENTRICULAR TACHYCARDIA): ICD-10-CM

## 2024-01-03 DIAGNOSIS — I10 PRIMARY HYPERTENSION: Primary | ICD-10-CM

## 2024-01-03 DIAGNOSIS — E78.00 HYPERCHOLESTEREMIA: ICD-10-CM

## 2024-01-03 DIAGNOSIS — I49.3 PVC (PREMATURE VENTRICULAR CONTRACTION): ICD-10-CM

## 2024-01-03 DIAGNOSIS — E66.3 OVERWEIGHT: ICD-10-CM

## 2024-01-03 RX ORDER — DILTIAZEM HYDROCHLORIDE 120 MG/1
120 CAPSULE, COATED, EXTENDED RELEASE ORAL DAILY
Qty: 30 CAPSULE | Refills: 8 | Status: SHIPPED | OUTPATIENT
Start: 2024-01-03

## 2024-01-03 RX ORDER — ROSUVASTATIN CALCIUM 10 MG/1
10 TABLET, COATED ORAL DAILY
Qty: 30 TABLET | Refills: 8 | Status: SHIPPED | OUTPATIENT
Start: 2024-01-03

## 2024-01-03 RX ORDER — DIGOXIN 250 MCG
250 TABLET ORAL
Qty: 30 TABLET | Refills: 8 | Status: SHIPPED | OUTPATIENT
Start: 2024-01-03

## 2024-01-03 RX ORDER — LISINOPRIL AND HYDROCHLOROTHIAZIDE 12.5; 1 MG/1; MG/1
1 TABLET ORAL DAILY
Qty: 30 TABLET | Refills: 8 | Status: SHIPPED | OUTPATIENT
Start: 2024-01-03

## 2024-01-03 NOTE — PROGRESS NOTES
Chief Complaint   Patient presents with    Follow-up     Pt is here for cardiac follow up.  Pt states she has palpitations daily.  She denies CP, dizziness or palpitations.  Pt does not take a daily ASA.      Med Refill     Pt request 30 day refills to be sent to the [facility].  Medications were verified by the pt.      Lab Work     Pt states their last labs were about a year ago with her PCP.         Cardiac Complaints  palpitations      Subjective   Ritu Beltran is a 68 y.o. female with HTN, hyperlipidemia, palpitations, and hypothyroidism. Shew was referred in May 2022 for CP, SOA, and palpitations. She had been evaluated in the hospital many years prior and underwent stress test in the form of dobutamine which showed reverse redistribution. She then underwent cardiac catheterization found to have a non critical disease involving the diagonal.  She also was noted to have multiple PVC's.  She was started on beta-blockers and apparently developed some reaction to it.  She wore a monitor that showed few runs of SVT for which she was put on Lanoxin after which the palpitation did improve. She was urged to undergo cardiac workup at consult with stress and echo. Stress showed HTN response, EF 58%, and no obvious ischemia noted, lisinopril dosing increased. Mild pulm HTN noted on echo with mild to mod MR.    She comes today for follow up and admits to palpitations daily. She admits they have been the same for years, no worse than prior. No CP, SOA, dizziness, or syncope noted. She does admit BP has been running higher at home, often high 140's at home. Refills requested. Labs with PCP, admits it has been about a year since labs have been done.             Cardiac History  Past Surgical History:   Procedure Laterality Date    CARDIAC CATHETERIZATION  02/21/2007    30% D1. EF 65%. Mild MR    CARDIOVASCULAR STRESS TEST  02/20/2007    @ [facility]. Dobutamine- 87% THR. 162/54. EF 58%.Reverse Redistribution at [place]     CARDIOVASCULAR STRESS TEST  06/23/2022    6 Min.7.0 METS. 88% THR. BP- 221/82. EF 58%. Breast Attenuation    CONVERTED (HISTORICAL) HOLTER  07/05/2007    Avg 80. . 4 runs of SVT.    CYST REMOVAL  08/2021    back    ECHO - CONVERTED  02/20/2007    @ [facility]. EF 65%. ? Inferoseptal WMA. Mild MR    ECHO - CONVERTED  06/23/2022    EF 60%. Mild-Mod MR. RVSP- 36 mmHg    EYE SURGERY      MASTECTOMY Right 2004    NOSE SURGERY  1968    RECONSTRUCTION BREAST IMMEDIATE / DELAYED W/ TISSUE EXPANDER Right 2004       Current Outpatient Medications   Medication Sig Dispense Refill    digoxin (LANOXIN) 250 MCG tablet Take 1 tablet by mouth Daily. 30 tablet 8    dilTIAZem CD (CARDIZEM CD) 120 MG 24 hr capsule Take 1 capsule by mouth Daily. 30 capsule 8    Ergocalciferol (VITAMIN D2 PO) Take 50,000 Units by mouth 1 (One) Time Per Week.      escitalopram (LEXAPRO) 5 MG tablet Take 1 tablet by mouth Daily.      LATANOPROST OP Latanoprost      levothyroxine (SYNTHROID, LEVOTHROID) 75 MCG tablet levothyroxine 75 mcg tablet      meloxicam (MOBIC) 7.5 MG tablet Take 1 tablet by mouth Daily.      Omeprazole 20 MG tablet delayed-release omeprazole 20 mg capsule,delayed release      rosuvastatin (CRESTOR) 10 MG tablet Take 1 tablet by mouth Daily. 30 tablet 8    lisinopril-hydrochlorothiazide (Zestoretic) 10-12.5 MG per tablet Take 1 tablet by mouth Daily. 30 tablet 8     No current facility-administered medications for this visit.       Chocolate, Metoprolol, and Sulfa antibiotics    Past Medical History:   Diagnosis Date    Anxiety     Anxiety     Arthritis     Breast cancer in female 2004    Depression     Disease of thyroid gland     Gastroesophageal reflux disease     Glaucoma     Hyperlipidemia     Hypertension     Migraines     Osteoporosis     PVC (premature ventricular contraction)     Vitamin D deficiency        Social History     Socioeconomic History    Marital status: Single   Tobacco Use    Smoking status: Former    Smokeless  "tobacco: Never   Vaping Use    Vaping Use: Never used   Substance and Sexual Activity    Alcohol use: No    Drug use: No       Family History   Problem Relation Age of Onset    Diabetes Mother     Stroke Mother     Heart disease Father     Diabetes Brother     Diabetes Maternal Grandmother     Heart disease Maternal Grandfather     Diabetes Maternal Grandfather     Stroke Paternal Grandmother     Inflammatory bowel disease Paternal Grandfather     No Known Problems Daughter     No Known Problems Daughter     No Known Problems Son        Review of Systems   Constitutional: Negative for malaise/fatigue and night sweats.   Cardiovascular:  Positive for palpitations. Negative for chest pain, claudication, dyspnea on exertion, irregular heartbeat, leg swelling, near-syncope, orthopnea and syncope.   Respiratory:  Negative for cough, shortness of breath and wheezing.    Musculoskeletal:  Negative for back pain, joint pain and stiffness.   Gastrointestinal:  Negative for anorexia, heartburn, nausea and vomiting.   Genitourinary:  Negative for dysuria, hematuria, hesitancy and nocturia.   Neurological:  Negative for dizziness, headaches and light-headedness.   Psychiatric/Behavioral:  Negative for depression and memory loss. The patient is not nervous/anxious.            Objective     /90 (BP Location: Left arm, Patient Position: Sitting)   Pulse 60   Ht 162.6 cm (64\")   Wt 74.7 kg (164 lb 9.6 oz)   BMI 28.25 kg/m²     Constitutional:       Appearance: Not in distress.   Eyes:      Pupils: Pupils are equal, round, and reactive to light.   HENT:      Nose: Nose normal.   Pulmonary:      Effort: Pulmonary effort is normal.      Breath sounds: Normal breath sounds.   Cardiovascular:      PMI at left midclavicular line. Normal rate. Regular rhythm.      Murmurs: There is a systolic murmur.   Abdominal:      Palpations: Abdomen is soft.   Musculoskeletal: Normal range of motion.      Cervical back: Normal range of " motion and neck supple. Skin:     General: Skin is warm and dry.   Neurological:      Mental Status: Alert.         Procedures         Diagnoses and all orders for this visit:    1. Primary hypertension (Primary)    2. PVC (premature ventricular contraction)    3. PSVT (paroxysmal supraventricular tachycardia)    4. Palpitations    5. Hypercholesteremia    6. Overweight    Other orders  -     lisinopril-hydrochlorothiazide (Zestoretic) 10-12.5 MG per tablet; Take 1 tablet by mouth Daily.  Dispense: 30 tablet; Refill: 8  -     digoxin (LANOXIN) 250 MCG tablet; Take 1 tablet by mouth Daily.  Dispense: 30 tablet; Refill: 8  -     dilTIAZem CD (CARDIZEM CD) 120 MG 24 hr capsule; Take 1 capsule by mouth Daily.  Dispense: 30 capsule; Refill: 8  -     rosuvastatin (CRESTOR) 10 MG tablet; Take 1 tablet by mouth Daily.  Dispense: 30 tablet; Refill: 8             Palpitations: Noted today, reported as daily. She was advised to limit her caffeine intake. We will continue on digoxin therapy as well as cardizem at same.     HTN: BP still not at goal. Will change lisinopril to lisinopril to zestoretic therapy. Limited sodium urged. Will continue CCB at same.     Hyperlipidemia: On crestor therapy. Tolerates well. FLP with your office, advised to have labs with you soon as it has been about a year. Limited carb diet urged.      Cardiac status: Stable today. Most recent workup from 2022 negative for ischemia, HTN BP response, and normal LV function.       Hypothyroidism: On synthroid therapy, tolerates well. TSH followed by your office. Palpitations noted, no worse than prior.     BMI noted at 28.25, good cardiac diet urged with walking as tolerated advised.      Refills per request.     6 month follow up advised or sooner if needed.         Problems Addressed this Visit          Cardiac and Vasculature    PSVT (paroxysmal supraventricular tachycardia)    Relevant Medications    digoxin (LANOXIN) 250 MCG tablet    dilTIAZem CD  (CARDIZEM CD) 120 MG 24 hr capsule    PVC (premature ventricular contraction)    Relevant Medications    digoxin (LANOXIN) 250 MCG tablet    dilTIAZem CD (CARDIZEM CD) 120 MG 24 hr capsule    Hypercholesteremia    Relevant Medications    rosuvastatin (CRESTOR) 10 MG tablet    Primary hypertension - Primary    Relevant Medications    lisinopril-hydrochlorothiazide (Zestoretic) 10-12.5 MG per tablet    dilTIAZem CD (CARDIZEM CD) 120 MG 24 hr capsule    Palpitations     Other Visit Diagnoses       Overweight              Diagnoses         Codes Comments    Primary hypertension    -  Primary ICD-10-CM: I10  ICD-9-CM: 401.9     PVC (premature ventricular contraction)     ICD-10-CM: I49.3  ICD-9-CM: 427.69     PSVT (paroxysmal supraventricular tachycardia)     ICD-10-CM: I47.10  ICD-9-CM: 427.0     Palpitations     ICD-10-CM: R00.2  ICD-9-CM: 785.1     Hypercholesteremia     ICD-10-CM: E78.00  ICD-9-CM: 272.0     Overweight     ICD-10-CM: E66.3  ICD-9-CM: 278.02                         Electronically signed by Colette Garibay, DENISE January 3, 2024 12:19 EST

## 2024-07-03 ENCOUNTER — OFFICE VISIT (OUTPATIENT)
Dept: CARDIOLOGY | Facility: CLINIC | Age: 69
End: 2024-07-03
Payer: MEDICARE

## 2024-07-03 VITALS
DIASTOLIC BLOOD PRESSURE: 72 MMHG | SYSTOLIC BLOOD PRESSURE: 120 MMHG | HEART RATE: 56 BPM | HEIGHT: 64 IN | BODY MASS INDEX: 28.1 KG/M2 | WEIGHT: 164.6 LBS

## 2024-07-03 DIAGNOSIS — R00.2 PALPITATIONS: ICD-10-CM

## 2024-07-03 DIAGNOSIS — I20.89 ANGINAL EQUIVALENT: ICD-10-CM

## 2024-07-03 DIAGNOSIS — E78.00 HYPERCHOLESTEREMIA: ICD-10-CM

## 2024-07-03 DIAGNOSIS — E66.3 OVERWEIGHT: ICD-10-CM

## 2024-07-03 DIAGNOSIS — I10 PRIMARY HYPERTENSION: ICD-10-CM

## 2024-07-03 DIAGNOSIS — E03.9 ACQUIRED HYPOTHYROIDISM: ICD-10-CM

## 2024-07-03 DIAGNOSIS — I49.3 PVC (PREMATURE VENTRICULAR CONTRACTION): ICD-10-CM

## 2024-07-03 DIAGNOSIS — R07.89 CHEST DISCOMFORT: Primary | ICD-10-CM

## 2024-07-03 RX ORDER — LISINOPRIL AND HYDROCHLOROTHIAZIDE 12.5; 1 MG/1; MG/1
1 TABLET ORAL DAILY
Qty: 30 TABLET | Refills: 8 | Status: SHIPPED | OUTPATIENT
Start: 2024-07-03

## 2024-07-03 RX ORDER — ROSUVASTATIN CALCIUM 10 MG/1
10 TABLET, COATED ORAL DAILY
Qty: 30 TABLET | Refills: 8 | Status: SHIPPED | OUTPATIENT
Start: 2024-07-03

## 2024-07-03 RX ORDER — DIGOXIN 250 MCG
250 TABLET ORAL
Qty: 30 TABLET | Refills: 8 | Status: SHIPPED | OUTPATIENT
Start: 2024-07-03

## 2024-07-03 RX ORDER — DILTIAZEM HYDROCHLORIDE 120 MG/1
120 CAPSULE, COATED, EXTENDED RELEASE ORAL DAILY
Qty: 30 CAPSULE | Refills: 8 | Status: SHIPPED | OUTPATIENT
Start: 2024-07-03

## 2024-07-03 NOTE — PROGRESS NOTES
Chief Complaint   Patient presents with    Follow-up     Pt is here for cardiac follow up.  Pt states she has pressure in her back and goes into neck.  She states it has been recently occurring at night.  She denies SOB, dizziness or palpitations.  Pt does not take a daily ASA.      Med Refill     Pt request 30 day refills to be sent to the [facility].  Medications were verified by the pt.      Lab Work     Pt states their last labs were with her PCP.         Cardiac Complaints  chest pressure/discomfort      Subjective   Ritu Beltran is a 68 y.o. female with HTN, hyperlipidemia, palpitations, and hypothyroidism. Shew was referred in May 2022 for CP, SOA, and palpitations. She had been evaluated in the hospital many years prior and underwent stress test in the form of dobutamine which showed reverse redistribution. She then underwent cardiac catheterization found to have a non critical disease involving the diagonal.  She also was noted to have multiple PVC's.  She was started on beta-blockers and apparently developed some reaction to it.  She wore a monitor that showed few runs of SVT for which she was put on Lanoxin after which the palpitation did improve. She was urged to undergo cardiac workup at consult with stress and echo. Stress showed HTN response, EF 58%, and no obvious ischemia noted, lisinopril dosing increased. Mild pulm HTN noted on echo with mild to mod MR.     She comes today for follow up and admits to pressure in her back that goes into her neck at night, associated symptoms are denied. She reports this is happening at rest. No SOA, palpitations, dizziness, or syncope noted. Labs with PCP, unsure of last check. Refills requested.      Cardiac History  Past Surgical History:   Procedure Laterality Date    CARDIAC CATHETERIZATION  02/21/2007    30% D1. EF 65%. Mild MR    CARDIOVASCULAR STRESS TEST  02/20/2007    @ [facility]. Dobutamine- 87% THR. 162/54. EF 58%.Reverse Redistribution at [place]     CARDIOVASCULAR STRESS TEST  06/23/2022    6 Min.7.0 METS. 88% THR. BP- 221/82. EF 58%. Breast Attenuation    CONVERTED (HISTORICAL) HOLTER  07/05/2007    Avg 80. . 4 runs of SVT.    CYST REMOVAL  08/2021    back    ECHO - CONVERTED  02/20/2007    @ [facility]. EF 65%. ? Inferoseptal WMA. Mild MR    ECHO - CONVERTED  06/23/2022    EF 60%. Mild-Mod MR. RVSP- 36 mmHg    EYE SURGERY      MASTECTOMY Right 2004    NOSE SURGERY  1968    RECONSTRUCTION BREAST IMMEDIATE / DELAYED W/ TISSUE EXPANDER Right 2004       Current Outpatient Medications   Medication Sig Dispense Refill    digoxin (LANOXIN) 250 MCG tablet Take 1 tablet by mouth Daily. 30 tablet 8    dilTIAZem CD (CARDIZEM CD) 120 MG 24 hr capsule Take 1 capsule by mouth Daily. 30 capsule 8    Ergocalciferol (VITAMIN D2 PO) Take 50,000 Units by mouth 1 (One) Time Per Week.      escitalopram (LEXAPRO) 5 MG tablet Take 1 tablet by mouth Daily.      LATANOPROST OP Latanoprost      levothyroxine (SYNTHROID, LEVOTHROID) 75 MCG tablet levothyroxine 75 mcg tablet      lisinopril-hydrochlorothiazide (Zestoretic) 10-12.5 MG per tablet Take 1 tablet by mouth Daily. 30 tablet 8    meloxicam (MOBIC) 7.5 MG tablet Take 1 tablet by mouth Daily.      Omeprazole 20 MG tablet delayed-release omeprazole 20 mg capsule,delayed release      rosuvastatin (CRESTOR) 10 MG tablet Take 1 tablet by mouth Daily. 30 tablet 8     No current facility-administered medications for this visit.       Chocolate, Metoprolol, and Sulfa antibiotics    Past Medical History:   Diagnosis Date    Anxiety     Anxiety     Arthritis     Breast cancer in female 2004    Depression     Disease of thyroid gland     Gastroesophageal reflux disease     Glaucoma     Hyperlipidemia     Hypertension     Migraines     Osteoporosis     PVC (premature ventricular contraction)     Vitamin D deficiency        Social History     Socioeconomic History    Marital status: Single   Tobacco Use    Smoking status: Former    Smokeless  "tobacco: Never   Vaping Use    Vaping status: Never Used   Substance and Sexual Activity    Alcohol use: No    Drug use: No       Family History   Problem Relation Age of Onset    Diabetes Mother     Stroke Mother     Heart disease Father     Diabetes Brother     Diabetes Maternal Grandmother     Heart disease Maternal Grandfather     Diabetes Maternal Grandfather     Stroke Paternal Grandmother     Inflammatory bowel disease Paternal Grandfather     No Known Problems Daughter     No Known Problems Daughter     No Known Problems Son        Review of Systems   Constitutional: Negative for malaise/fatigue and night sweats.   Cardiovascular:  Positive for chest pain. Negative for claudication, dyspnea on exertion, irregular heartbeat, leg swelling, near-syncope, orthopnea, palpitations and syncope.   Respiratory:  Negative for cough, shortness of breath and wheezing.    Musculoskeletal:  Negative for back pain, joint pain and stiffness.   Gastrointestinal:  Negative for anorexia, heartburn, nausea and vomiting.   Genitourinary:  Negative for dysuria, hematuria, hesitancy and nocturia.   Neurological:  Negative for dizziness, light-headedness and loss of balance.   Psychiatric/Behavioral:  Negative for depression and memory loss. The patient is not nervous/anxious.            Objective     /72 (BP Location: Left arm, Patient Position: Sitting)   Pulse 56   Ht 162.6 cm (64\")   Wt 74.7 kg (164 lb 9.6 oz)   BMI 28.25 kg/m²     Constitutional:       Appearance: Not in distress.   Eyes:      Pupils: Pupils are equal, round, and reactive to light.   HENT:      Nose: Nose normal.   Pulmonary:      Effort: Pulmonary effort is normal.      Breath sounds: Normal breath sounds.   Cardiovascular:      PMI at left midclavicular line. Bradycardia present. Regular rhythm.      Murmurs: There is a systolic murmur.   Abdominal:      Palpations: Abdomen is soft.   Musculoskeletal: Normal range of motion.      Cervical back: " Normal range of motion and neck supple. Skin:     General: Skin is warm and dry.   Neurological:      Mental Status: Alert.         Procedures         Diagnoses and all orders for this visit:    1. Chest discomfort (Primary)  -     Stress Test With Myocardial Perfusion One Day; Future    2. Palpitations  -     Stress Test With Myocardial Perfusion One Day; Future    3. Anginal equivalent  -     Stress Test With Myocardial Perfusion One Day; Future    4. PVC (premature ventricular contraction)    5. Primary hypertension    6. Hypercholesteremia    7. Acquired hypothyroidism    8. Overweight    Other orders  -     dilTIAZem CD (CARDIZEM CD) 120 MG 24 hr capsule; Take 1 capsule by mouth Daily.  Dispense: 30 capsule; Refill: 8  -     lisinopril-hydrochlorothiazide (Zestoretic) 10-12.5 MG per tablet; Take 1 tablet by mouth Daily.  Dispense: 30 tablet; Refill: 8  -     rosuvastatin (CRESTOR) 10 MG tablet; Take 1 tablet by mouth Daily.  Dispense: 30 tablet; Refill: 8  -     digoxin (LANOXIN) 250 MCG tablet; Take 1 tablet by mouth Daily.  Dispense: 30 tablet; Refill: 8             Palpitations: Improved. She continue to limit her caffeine intake. We will continue on digoxin therapy as well as cardizem.  Please consider digoxin level with next labs.     HTN: BP now at goal, will continue zestoretic at same as well as well as CCB. Limited sodium urged.      Hyperlipidemia: On crestor therapy. Tolerates well. FLP with your office, advised to have labs with you soon. Limited carb diet urged.      Cardiac status: She reports more chest pain at night. She is concerned about cardiac status, will repeat workup to assess for ischemic burden.      Hypothyroidism: On synthroid therapy, tolerates well. TSH followed by your office. Palpitations improved. Advised to have checked again soon.     BMI noted at 28.25, good cardiac diet urged with walking as tolerated advised.      Refills per request.     6 month follow up advised or sooner  if needed.           Problems Addressed this Visit          Cardiac and Vasculature    PVC (premature ventricular contraction)    Relevant Medications    dilTIAZem CD (CARDIZEM CD) 120 MG 24 hr capsule    digoxin (LANOXIN) 250 MCG tablet    Hypercholesteremia    Relevant Medications    rosuvastatin (CRESTOR) 10 MG tablet    Primary hypertension    Relevant Medications    dilTIAZem CD (CARDIZEM CD) 120 MG 24 hr capsule    lisinopril-hydrochlorothiazide (Zestoretic) 10-12.5 MG per tablet    Palpitations    Relevant Orders    Stress Test With Myocardial Perfusion One Day       Endocrine and Metabolic    Hypothyroidism     Other Visit Diagnoses       Chest discomfort    -  Primary    Relevant Orders    Stress Test With Myocardial Perfusion One Day    Anginal equivalent        Relevant Medications    dilTIAZem CD (CARDIZEM CD) 120 MG 24 hr capsule    digoxin (LANOXIN) 250 MCG tablet    Other Relevant Orders    Stress Test With Myocardial Perfusion One Day    Overweight              Diagnoses         Codes Comments    Chest discomfort    -  Primary ICD-10-CM: R07.89  ICD-9-CM: 786.59     Palpitations     ICD-10-CM: R00.2  ICD-9-CM: 785.1     Anginal equivalent     ICD-10-CM: I20.89  ICD-9-CM: 413.9     PVC (premature ventricular contraction)     ICD-10-CM: I49.3  ICD-9-CM: 427.69     Primary hypertension     ICD-10-CM: I10  ICD-9-CM: 401.9     Hypercholesteremia     ICD-10-CM: E78.00  ICD-9-CM: 272.0     Acquired hypothyroidism     ICD-10-CM: E03.9  ICD-9-CM: 244.9     Overweight     ICD-10-CM: E66.3  ICD-9-CM: 278.02                         Electronically signed by Colette Garibay, APRN July 3, 2024 11:28 EDT

## 2024-07-24 ENCOUNTER — HOSPITAL ENCOUNTER (OUTPATIENT)
Dept: CARDIOLOGY | Facility: HOSPITAL | Age: 69
Discharge: HOME | End: 2024-07-24
Payer: MEDICARE

## 2024-07-24 DIAGNOSIS — R00.2 PALPITATIONS: ICD-10-CM

## 2024-07-24 DIAGNOSIS — R07.89 CHEST DISCOMFORT: ICD-10-CM

## 2024-07-24 DIAGNOSIS — I20.89 ANGINAL EQUIVALENT: ICD-10-CM

## 2024-07-24 LAB
BH CV REST NUCLEAR ISOTOPE DOSE: 10 MCI
BH CV STRESS NUCLEAR ISOTOPE DOSE: 30 MCI
BH CV STRESS RECOVERY BP: NORMAL MMHG
BH CV STRESS RECOVERY HR: 71 BPM
LV EF NUC BP: 76 %
MAXIMAL PREDICTED HEART RATE: 150 BPM
PERCENT MAX PREDICTED HR: 88.67 %
STRESS BASELINE HR: 54 BPM
STRESS PERCENT HR: 104 %
STRESS POST ESTIMATED WORKLOAD: 7 METS
STRESS POST EXERCISE DUR MIN: 6 MIN
STRESS POST EXERCISE DUR SEC: 10 SEC
STRESS POST PEAK BP: NORMAL MMHG
STRESS POST PEAK HR: 133 BPM
STRESS TARGET HR: 128 BPM

## 2024-07-24 PROCEDURE — 93017 CV STRESS TEST TRACING ONLY: CPT

## 2024-07-24 PROCEDURE — 0 TECHNETIUM SESTAMIBI: Performed by: INTERNAL MEDICINE

## 2024-07-24 PROCEDURE — A9500 TC99M SESTAMIBI: HCPCS | Performed by: INTERNAL MEDICINE

## 2024-07-24 PROCEDURE — 78452 HT MUSCLE IMAGE SPECT MULT: CPT

## 2024-07-24 RX ADMIN — TECHNETIUM TC 99M SESTAMIBI 1 DOSE: 1 INJECTION INTRAVENOUS at 08:44

## 2024-07-24 RX ADMIN — TECHNETIUM TC 99M SESTAMIBI 1 DOSE: 1 INJECTION INTRAVENOUS at 10:07

## 2024-11-20 RX ORDER — DILTIAZEM HYDROCHLORIDE 120 MG/1
120 CAPSULE, COATED, EXTENDED RELEASE ORAL DAILY
Qty: 30 CAPSULE | Refills: 2 | Status: SHIPPED | OUTPATIENT
Start: 2024-11-20

## 2024-11-20 NOTE — TELEPHONE ENCOUNTER
Patient left  requesting refill on her diltiazem to [facility].  She no longer uses [facility].  Pharmacy updated.  Next follow up 01/13/2025.  Refill sent to [facility].

## 2025-01-13 ENCOUNTER — OFFICE VISIT (OUTPATIENT)
Dept: CARDIOLOGY | Facility: CLINIC | Age: 70
End: 2025-01-13
Payer: MEDICARE

## 2025-01-13 VITALS
BODY MASS INDEX: 28.68 KG/M2 | WEIGHT: 168 LBS | HEIGHT: 64 IN | SYSTOLIC BLOOD PRESSURE: 130 MMHG | HEART RATE: 68 BPM | DIASTOLIC BLOOD PRESSURE: 80 MMHG

## 2025-01-13 DIAGNOSIS — I49.3 PVC (PREMATURE VENTRICULAR CONTRACTION): ICD-10-CM

## 2025-01-13 DIAGNOSIS — R00.2 PALPITATIONS: ICD-10-CM

## 2025-01-13 DIAGNOSIS — I10 PRIMARY HYPERTENSION: ICD-10-CM

## 2025-01-13 DIAGNOSIS — E66.3 OVERWEIGHT: ICD-10-CM

## 2025-01-13 DIAGNOSIS — R07.89 CHEST PAIN, ATYPICAL: ICD-10-CM

## 2025-01-13 DIAGNOSIS — E78.00 HYPERCHOLESTEREMIA: ICD-10-CM

## 2025-01-13 DIAGNOSIS — I47.10 PSVT (PAROXYSMAL SUPRAVENTRICULAR TACHYCARDIA): Primary | ICD-10-CM

## 2025-01-13 PROCEDURE — 3079F DIAST BP 80-89 MM HG: CPT | Performed by: NURSE PRACTITIONER

## 2025-01-13 PROCEDURE — 3075F SYST BP GE 130 - 139MM HG: CPT | Performed by: NURSE PRACTITIONER

## 2025-01-13 PROCEDURE — 99214 OFFICE O/P EST MOD 30 MIN: CPT | Performed by: NURSE PRACTITIONER

## 2025-01-13 RX ORDER — LISINOPRIL AND HYDROCHLOROTHIAZIDE 10; 12.5 MG/1; MG/1
1 TABLET ORAL DAILY
Qty: 90 TABLET | Refills: 2 | Status: SHIPPED | OUTPATIENT
Start: 2025-01-13

## 2025-01-13 RX ORDER — DIGOXIN 250 MCG
250 TABLET ORAL
Qty: 90 TABLET | Refills: 2 | Status: SHIPPED | OUTPATIENT
Start: 2025-01-13

## 2025-01-13 RX ORDER — DILTIAZEM HYDROCHLORIDE 120 MG/1
120 CAPSULE, COATED, EXTENDED RELEASE ORAL DAILY
Qty: 30 CAPSULE | Refills: 2 | Status: SHIPPED | OUTPATIENT
Start: 2025-01-13

## 2025-01-13 RX ORDER — ROSUVASTATIN CALCIUM 10 MG/1
10 TABLET, COATED ORAL DAILY
Qty: 90 TABLET | Refills: 2 | Status: SHIPPED | OUTPATIENT
Start: 2025-01-13

## 2025-01-13 NOTE — PROGRESS NOTES
Chief Complaint   Patient presents with    Follow-up     For cardiac management. Patient is not on aspirin. Last lab work was done on 09/25/24 per PCP, in chart under media, looks like digoxin was 1.3. States that she does occasionally have chest pain, sometimes feels like a twinge of pain. States that palpitations have improved.     Med Refill     Needs refills on cardiac medications. 90 day supplies to [facility] in [place]. Brought medication list with visit.        Cardiac Complaints  palpitations      Subjective   Ritu Beltran is a 69 y.o. female with HTN, hyperlipidemia, palpitations, and hypothyroidism. Shew was referred in May 2022 for CP, SOA, and palpitations. She had been evaluated in the hospital many years prior and underwent stress test in the form of dobutamine which showed reverse redistribution. She then underwent cardiac catheterization found to have a non critical disease involving the diagonal.  She also was noted to have multiple PVC's.  She was started on beta-blockers and apparently developed some reaction to it.  She wore a monitor that showed few runs of SVT for which she was put on Lanoxin after which the palpitation did improve. Repeat workup done 2024 showed EF >70%, possible ischemia vs breast attenuation, home meds continued.    She comes today for follow up and denies new concerns. She does occasional have a sharp chest pain, but lasts only briefly and goes quickly away. Palpitations improved. Labs 9/2024 showed: CRP 2, HH 13.3/42.9, BUN 15, Creatinine 0.91, Na 139, K 4.7, GFR 67, Dig 1.3, Mag 2.1, TSH 0.721, LDL 55, HDL 48, TRIG 204. Refills requested.          Cardiac History  Past Surgical History:   Procedure Laterality Date    CARDIAC CATHETERIZATION  02/21/2007    30% D1. EF 65%. Mild MR    CARDIOVASCULAR STRESS TEST  02/20/2007    @ [facility]. Dobutamine- 87% THR. 162/54. EF 58%.Reverse Redistribution at [place]    CARDIOVASCULAR STRESS TEST  06/23/2022    6 Min.7.0 METS.  88% THR. BP- 221/82. EF 58%. Breast Attenuation    CARDIOVASCULAR STRESS TEST  07/24/2024    6.10 Min. 7.0 METS. 88% THR. 160/80. EF > 70%. ST-T. R/O anterior Ischemia    CONVERTED (HISTORICAL) HOLTER  07/05/2007    Avg 80. . 4 runs of SVT.    CYST REMOVAL  08/2021    back    ECHO - CONVERTED  02/20/2007    @ [facility]. EF 65%. ? Inferoseptal WMA. Mild MR    ECHO - CONVERTED  06/23/2022    EF 60%. Mild-Mod MR. RVSP- 36 mmHg    EYE SURGERY      MASTECTOMY Right 2004    NOSE SURGERY  1968    RECONSTRUCTION BREAST IMMEDIATE / DELAYED W/ TISSUE EXPANDER Right 2004       Current Outpatient Medications   Medication Sig Dispense Refill    digoxin (LANOXIN) 250 MCG tablet Take 1 tablet by mouth Daily. 90 tablet 2    dilTIAZem CD (CARDIZEM CD) 120 MG 24 hr capsule Take 1 capsule by mouth Daily. 30 capsule 2    Ergocalciferol (VITAMIN D2 PO) Take 50,000 Units by mouth 1 (One) Time Per Week.      escitalopram (LEXAPRO) 5 MG tablet Take 1 tablet by mouth Daily.      LATANOPROST OP Latanoprost      levothyroxine (SYNTHROID, LEVOTHROID) 75 MCG tablet levothyroxine 75 mcg tablet      lisinopril-hydrochlorothiazide (Zestoretic) 10-12.5 MG per tablet Take 1 tablet by mouth Daily. 90 tablet 2    meloxicam (MOBIC) 7.5 MG tablet Take 1 tablet by mouth Daily.      Omeprazole 20 MG tablet delayed-release omeprazole 20 mg capsule,delayed release      rosuvastatin (CRESTOR) 10 MG tablet Take 1 tablet by mouth Daily. 90 tablet 2     No current facility-administered medications for this visit.       Chocolate, Metoprolol, and Sulfa antibiotics    Past Medical History:   Diagnosis Date    Anxiety     Anxiety     Arthritis     Breast cancer in female 2004    Depression     Disease of thyroid gland     Gastroesophageal reflux disease     Glaucoma     Hyperlipidemia     Hypertension     Migraines     Osteoporosis     PVC (premature ventricular contraction)     Vitamin D deficiency        Social History     Socioeconomic History    Marital  "status: Single   Tobacco Use    Smoking status: Former    Smokeless tobacco: Never   Vaping Use    Vaping status: Never Used   Substance and Sexual Activity    Alcohol use: Yes     Comment: Might drink a glass of wine a month.    Drug use: No       Family History   Problem Relation Age of Onset    Diabetes Mother     Stroke Mother     Heart disease Father     Diabetes Brother     Diabetes Maternal Grandmother     Heart disease Maternal Grandfather     Diabetes Maternal Grandfather     Stroke Paternal Grandmother     Inflammatory bowel disease Paternal Grandfather     No Known Problems Daughter     No Known Problems Daughter     No Known Problems Son        Review of Systems   Constitutional: Negative for malaise/fatigue and night sweats.   Cardiovascular:  Positive for chest pain and palpitations. Negative for claudication, cyanosis, irregular heartbeat, leg swelling, near-syncope, orthopnea and syncope.   Respiratory:  Negative for cough, shortness of breath and wheezing.    Musculoskeletal:  Negative for back pain, joint pain and stiffness.   Gastrointestinal:  Negative for anorexia, heartburn, nausea and vomiting.   Genitourinary:  Negative for dysuria, hematuria, hesitancy and nocturia.   Neurological:  Negative for dizziness and light-headedness.   Psychiatric/Behavioral:  Negative for depression and memory loss. The patient is not nervous/anxious.            Objective     /80 (BP Location: Left arm)   Pulse 68   Ht 162.6 cm (64.02\")   Wt 76.2 kg (168 lb)   BMI 28.82 kg/m²     Constitutional:       Appearance: Not in distress.   Eyes:      Pupils: Pupils are equal, round, and reactive to light.   HENT:      Nose: Nose normal.   Pulmonary:      Effort: Pulmonary effort is normal.      Breath sounds: Normal breath sounds.   Cardiovascular:      PMI at left midclavicular line. Normal rate. Regular rhythm.      Murmurs: There is a systolic murmur.   Abdominal:      Palpations: Abdomen is soft. "   Musculoskeletal: Normal range of motion.      Cervical back: Normal range of motion and neck supple. Skin:     General: Skin is warm and dry.   Neurological:      Mental Status: Alert.         Procedures         Diagnoses and all orders for this visit:    1. PSVT (paroxysmal supraventricular tachycardia) (Primary)    2. PVC (premature ventricular contraction)    3. Primary hypertension    4. Palpitations    5. Chest pain, atypical    6. Hypercholesteremia    7. Overweight    Other orders  -     digoxin (LANOXIN) 250 MCG tablet; Take 1 tablet by mouth Daily.  Dispense: 90 tablet; Refill: 2  -     dilTIAZem CD (CARDIZEM CD) 120 MG 24 hr capsule; Take 1 capsule by mouth Daily.  Dispense: 30 capsule; Refill: 2  -     lisinopril-hydrochlorothiazide (Zestoretic) 10-12.5 MG per tablet; Take 1 tablet by mouth Daily.  Dispense: 90 tablet; Refill: 2  -     rosuvastatin (CRESTOR) 10 MG tablet; Take 1 tablet by mouth Daily.  Dispense: 90 tablet; Refill: 2             Palpitations: Improved. She was advised to limit her caffeine intake. We will continue on digoxin therapy as well as cardizem at same. Dig level of 1.3 noted, level under 1.5, K WNL, toxicity unlikely.     HTN: BP improved.  Will continue zestoretic therapy. Limited sodium urged. Will continue CCB at same.     Hyperlipidemia: On crestor therapy. Tolerates well. FLP with your office, most recent showed LDL at goal. Trigs with elevation, encouraged to limit carbs.     Cardiac status: Stable today. Most recent workup from 2024 negative for ischemia, will continue with current per recommendation.     Hypothyroidism: On synthroid therapy, tolerates well. TSH followed by your office. Palpitations noted, no worse than prior.     BMI noted at 28.82, good cardiac diet urged with walking as tolerated advised.      Refills per request.     6 month follow up advised or sooner if needed.                     Problems Addressed this Visit          Cardiac and Vasculature     PSVT (paroxysmal supraventricular tachycardia) - Primary    Relevant Medications    digoxin (LANOXIN) 250 MCG tablet    dilTIAZem CD (CARDIZEM CD) 120 MG 24 hr capsule    PVC (premature ventricular contraction)    Relevant Medications    digoxin (LANOXIN) 250 MCG tablet    dilTIAZem CD (CARDIZEM CD) 120 MG 24 hr capsule    Hypercholesteremia    Relevant Medications    rosuvastatin (CRESTOR) 10 MG tablet    Primary hypertension    Relevant Medications    dilTIAZem CD (CARDIZEM CD) 120 MG 24 hr capsule    lisinopril-hydrochlorothiazide (Zestoretic) 10-12.5 MG per tablet    Palpitations     Other Visit Diagnoses       Chest pain, atypical        Overweight              Diagnoses         Codes Comments    PSVT (paroxysmal supraventricular tachycardia)    -  Primary ICD-10-CM: I47.10  ICD-9-CM: 427.0     PVC (premature ventricular contraction)     ICD-10-CM: I49.3  ICD-9-CM: 427.69     Primary hypertension     ICD-10-CM: I10  ICD-9-CM: 401.9     Palpitations     ICD-10-CM: R00.2  ICD-9-CM: 785.1     Chest pain, atypical     ICD-10-CM: R07.89  ICD-9-CM: 786.59     Hypercholesteremia     ICD-10-CM: E78.00  ICD-9-CM: 272.0     Overweight     ICD-10-CM: E66.3  ICD-9-CM: 278.02                     Electronically signed by DENISE Rojas January 13, 2025 11:07 EST

## 2025-04-14 RX ORDER — LISINOPRIL AND HYDROCHLOROTHIAZIDE 10; 12.5 MG/1; MG/1
1 TABLET ORAL DAILY
Qty: 90 TABLET | Refills: 2 | OUTPATIENT
Start: 2025-04-14

## 2025-07-21 ENCOUNTER — OFFICE VISIT (OUTPATIENT)
Dept: CARDIOLOGY | Facility: CLINIC | Age: 70
End: 2025-07-21
Payer: MEDICARE

## 2025-07-21 VITALS
DIASTOLIC BLOOD PRESSURE: 72 MMHG | SYSTOLIC BLOOD PRESSURE: 128 MMHG | WEIGHT: 165 LBS | BODY MASS INDEX: 28.17 KG/M2 | HEART RATE: 60 BPM | HEIGHT: 64 IN

## 2025-07-21 DIAGNOSIS — I10 PRIMARY HYPERTENSION: ICD-10-CM

## 2025-07-21 DIAGNOSIS — R00.2 PALPITATIONS: ICD-10-CM

## 2025-07-21 DIAGNOSIS — I49.3 PVC (PREMATURE VENTRICULAR CONTRACTION): ICD-10-CM

## 2025-07-21 DIAGNOSIS — I47.10 PSVT (PAROXYSMAL SUPRAVENTRICULAR TACHYCARDIA): Primary | ICD-10-CM

## 2025-07-21 DIAGNOSIS — E78.00 HYPERCHOLESTEREMIA: ICD-10-CM

## 2025-07-21 DIAGNOSIS — E66.3 OVERWEIGHT: ICD-10-CM

## 2025-07-21 PROCEDURE — 3078F DIAST BP <80 MM HG: CPT | Performed by: NURSE PRACTITIONER

## 2025-07-21 PROCEDURE — 93000 ELECTROCARDIOGRAM COMPLETE: CPT | Performed by: NURSE PRACTITIONER

## 2025-07-21 PROCEDURE — 99214 OFFICE O/P EST MOD 30 MIN: CPT | Performed by: NURSE PRACTITIONER

## 2025-07-21 PROCEDURE — 3074F SYST BP LT 130 MM HG: CPT | Performed by: NURSE PRACTITIONER

## 2025-07-21 RX ORDER — ROSUVASTATIN CALCIUM 10 MG/1
10 TABLET, COATED ORAL DAILY
Qty: 90 TABLET | Refills: 2 | Status: SHIPPED | OUTPATIENT
Start: 2025-07-21

## 2025-07-21 RX ORDER — LISINOPRIL AND HYDROCHLOROTHIAZIDE 10; 12.5 MG/1; MG/1
1 TABLET ORAL DAILY
Qty: 90 TABLET | Refills: 2 | Status: SHIPPED | OUTPATIENT
Start: 2025-07-21

## 2025-07-21 RX ORDER — DILTIAZEM HYDROCHLORIDE 120 MG/1
120 CAPSULE, COATED, EXTENDED RELEASE ORAL DAILY
Qty: 30 CAPSULE | Refills: 2 | Status: SHIPPED | OUTPATIENT
Start: 2025-07-21

## 2025-07-21 RX ORDER — DIGOXIN 250 MCG
250 TABLET ORAL
Qty: 90 TABLET | Refills: 2 | Status: SHIPPED | OUTPATIENT
Start: 2025-07-21

## 2025-07-21 NOTE — PROGRESS NOTES
Chief Complaint   Patient presents with    Follow-up     For cardiac management. Patient is not on aspirin. Last lab work was done on 03/25/25 per PCP, it was a digoxin level, in chart under labs. States that she occasionally has palpitations. States that she will be having cataract surgery in [place] soon.     Med Refill     Needs refills on cardiac medications. 90 day supplies to [facility] in [place]. Brought medication list with visit.        Cardiac Complaints  palpitations      Subjective   Ritu Beltran is a 69 y.o. female with HTN, hyperlipidemia, palpitations, and hypothyroidism. Shew was referred in May 2022 for CP, SOA, and palpitations. She had been evaluated in the hospital many years prior and underwent stress test in the form of dobutamine which showed reverse redistribution. She then underwent cardiac catheterization found to have a non critical disease involving the diagonal.  She also was noted to have multiple PVC's.  She was started on beta-blockers and apparently developed some reaction to it.  She wore a monitor that showed few runs of SVT for which she was put on Lanoxin after which the palpitation did improve. Repeat workup done 2024 showed EF >70%, possible ischemia vs breast attenuation, home meds continued.     She comes today for follow up and new concerns are denied. She does admit to some palpitations but only rarely. No CP, SOA, dizziness, or syncope noted. Labs with PCP checked this spring Dig level 1.6. Will have cataract surgery soon. Refills requested.        Cardiac History  Past Surgical History:   Procedure Laterality Date    CARDIAC CATHETERIZATION  02/21/2007    30% D1. EF 65%. Mild MR    CARDIOVASCULAR STRESS TEST  02/20/2007    @ [facility]. Dobutamine- 87% THR. 162/54. EF 58%.Reverse Redistribution at [place]    CARDIOVASCULAR STRESS TEST  06/23/2022    6 Min.7.0 METS. 88% THR. BP- 221/82. EF 58%. Breast Attenuation    CARDIOVASCULAR STRESS TEST  07/24/2024    6.10 Min. 7.0  METS. 88% THR. 160/80. EF > 70%. ST-T. R/O anterior Ischemia    CONVERTED (HISTORICAL) HOLTER  07/05/2007    Avg 80. . 4 runs of SVT.    CYST REMOVAL  08/2021    back    ECHO - CONVERTED  02/20/2007    @ [facility]. EF 65%. ? Inferoseptal WMA. Mild MR    ECHO - CONVERTED  06/23/2022    EF 60%. Mild-Mod MR. RVSP- 36 mmHg    EYE SURGERY      MASTECTOMY Right 2004    NOSE SURGERY  1968    RECONSTRUCTION BREAST IMMEDIATE / DELAYED W/ TISSUE EXPANDER Right 2004       Current Outpatient Medications   Medication Sig Dispense Refill    digoxin (LANOXIN) 250 MCG tablet Take 1 tablet by mouth Daily. 90 tablet 2    dilTIAZem CD (CARDIZEM CD) 120 MG 24 hr capsule Take 1 capsule by mouth Daily. 30 capsule 2    Ergocalciferol (VITAMIN D2 PO) Take 50,000 Units by mouth 1 (One) Time Per Week.      escitalopram (LEXAPRO) 5 MG tablet Take 1 tablet by mouth Daily.      LATANOPROST OP Latanoprost      levothyroxine (SYNTHROID, LEVOTHROID) 75 MCG tablet levothyroxine 75 mcg tablet      lisinopril-hydrochlorothiazide (Zestoretic) 10-12.5 MG per tablet Take 1 tablet by mouth Daily. 90 tablet 2    meloxicam (MOBIC) 7.5 MG tablet Take 1 tablet by mouth Daily.      Omeprazole 20 MG tablet delayed-release omeprazole 20 mg capsule,delayed release      rosuvastatin (CRESTOR) 10 MG tablet Take 1 tablet by mouth Daily. 90 tablet 2     No current facility-administered medications for this visit.       Chocolate, Metoprolol, and Sulfa antibiotics    Past Medical History:   Diagnosis Date    Anxiety     Anxiety     Arthritis     Breast cancer in female 2004    Depression     Disease of thyroid gland     Gastroesophageal reflux disease     Glaucoma     Hyperlipidemia     Hypertension     Migraines     Osteoporosis     PVC (premature ventricular contraction)     Vitamin D deficiency        Social History     Socioeconomic History    Marital status: Single   Tobacco Use    Smoking status: Former    Smokeless tobacco: Never   Vaping Use    Vaping  "status: Never Used   Substance and Sexual Activity    Alcohol use: Yes     Comment: Might drink a glass of wine a month.    Drug use: No       Family History   Problem Relation Age of Onset    Diabetes Mother     Stroke Mother     Heart disease Father     Diabetes Brother     Diabetes Maternal Grandmother     Heart disease Maternal Grandfather     Diabetes Maternal Grandfather     Stroke Paternal Grandmother     Inflammatory bowel disease Paternal Grandfather     No Known Problems Daughter     No Known Problems Daughter     No Known Problems Son        Review of Systems   Constitutional: Negative for malaise/fatigue and night sweats.   Cardiovascular:  Positive for palpitations. Negative for chest pain, claudication, dyspnea on exertion, irregular heartbeat, leg swelling, near-syncope, orthopnea and syncope.   Respiratory:  Negative for cough, shortness of breath and wheezing.    Musculoskeletal:  Positive for stiffness. Negative for back pain and joint pain.   Gastrointestinal:  Negative for anorexia, heartburn, nausea and vomiting.   Genitourinary:  Negative for dysuria, hematuria, hesitancy and nocturia.   Neurological:  Negative for dizziness, headaches and light-headedness.   Psychiatric/Behavioral:  Negative for depression and memory loss. The patient is not nervous/anxious.            Objective     /72 (BP Location: Left arm)   Pulse 60   Ht 162.6 cm (64.02\")   Wt 74.8 kg (165 lb)   BMI 28.31 kg/m²     Constitutional:       Appearance: Not in distress.   Eyes:      Pupils: Pupils are equal, round, and reactive to light.   HENT:      Nose: Nose normal.   Pulmonary:      Effort: Pulmonary effort is normal.      Breath sounds: Normal breath sounds.   Cardiovascular:      PMI at left midclavicular line. Normal rate. Regular rhythm.      Murmurs: There is a systolic murmur.   Abdominal:      Palpations: Abdomen is soft.   Musculoskeletal: Normal range of motion.      Cervical back: Normal range of motion " and neck supple. Skin:     General: Skin is warm and dry.   Neurological:      Mental Status: Alert.           ECG 12 Lead    Date/Time: 7/21/2025 9:17 AM  Performed by: Colette Garibay APRN    Authorized by: Colette Garibay APRN  Comparison: compared with previous ECG from 5/18/2022  Similar to previous ECG  Rhythm: sinus rhythm  BPM: 60    Clinical impression: normal ECG  Comments: Normal QT               Diagnoses and all orders for this visit:    1. PSVT (paroxysmal supraventricular tachycardia) (Primary)  -     ECG 12 Lead    2. PVC (premature ventricular contraction)    3. Palpitations    4. Primary hypertension    5. Hypercholesteremia    6. Overweight    Other orders  -     digoxin (LANOXIN) 250 MCG tablet; Take 1 tablet by mouth Daily.  Dispense: 90 tablet; Refill: 2  -     dilTIAZem CD (CARDIZEM CD) 120 MG 24 hr capsule; Take 1 capsule by mouth Daily.  Dispense: 30 capsule; Refill: 2  -     lisinopril-hydrochlorothiazide (Zestoretic) 10-12.5 MG per tablet; Take 1 tablet by mouth Daily.  Dispense: 90 tablet; Refill: 2  -     rosuvastatin (CRESTOR) 10 MG tablet; Take 1 tablet by mouth Daily.  Dispense: 90 tablet; Refill: 2               Palpitations: Improved, only rarely. EKG done showed NSR with normal QT. She was advised to limit her caffeine intake. We will continue on digoxin therapy as well as cardizem at same. Dig level of 1.6 noted, toxicity unlikely. Consider recheck with you this summer.     HTN: BP stable.  Will continue zestoretic and CCB therapy. Limited sodium urged.      Hyperlipidemia: On crestor therapy. Tolerates well. FLP to be checked this summer, can we have for review?     Cardiac status: Stable today. Most recent workup from 2024 negative for ischemia, will continue with current, no new workup urged.     Hypothyroidism: On synthroid therapy, tolerates well. TSH followed by your office. Palpitations noted, but only rarely.    To have cataract surgery in about a week. If clearance  should be needed, she will fax a report to the office.     BMI noted at 28.31, good cardiac diet urged with walking as tolerated advised.      Refills per request.     6 month follow up advised or sooner if needed.                               Problems Addressed this Visit          Cardiac and Vasculature    PSVT (paroxysmal supraventricular tachycardia) - Primary    Relevant Medications    digoxin (LANOXIN) 250 MCG tablet    dilTIAZem CD (CARDIZEM CD) 120 MG 24 hr capsule    Other Relevant Orders    ECG 12 Lead    PVC (premature ventricular contraction)    Relevant Medications    digoxin (LANOXIN) 250 MCG tablet    dilTIAZem CD (CARDIZEM CD) 120 MG 24 hr capsule    Hypercholesteremia    Relevant Medications    rosuvastatin (CRESTOR) 10 MG tablet    Primary hypertension    Relevant Medications    dilTIAZem CD (CARDIZEM CD) 120 MG 24 hr capsule    lisinopril-hydrochlorothiazide (Zestoretic) 10-12.5 MG per tablet    Palpitations     Other Visit Diagnoses         Overweight              Diagnoses         Codes Comments      PSVT (paroxysmal supraventricular tachycardia)    -  Primary ICD-10-CM: I47.10  ICD-9-CM: 427.0       PVC (premature ventricular contraction)     ICD-10-CM: I49.3  ICD-9-CM: 427.69       Palpitations     ICD-10-CM: R00.2  ICD-9-CM: 785.1       Primary hypertension     ICD-10-CM: I10  ICD-9-CM: 401.9       Hypercholesteremia     ICD-10-CM: E78.00  ICD-9-CM: 272.0       Overweight     ICD-10-CM: E66.3  ICD-9-CM: 278.02                     Electronically signed by DENISE Rojas July 21, 2025 11:29 EDT